# Patient Record
Sex: FEMALE | Race: WHITE | NOT HISPANIC OR LATINO
[De-identification: names, ages, dates, MRNs, and addresses within clinical notes are randomized per-mention and may not be internally consistent; named-entity substitution may affect disease eponyms.]

---

## 2017-03-16 ENCOUNTER — NON-APPOINTMENT (OUTPATIENT)
Age: 82
End: 2017-03-16

## 2017-03-16 ENCOUNTER — APPOINTMENT (OUTPATIENT)
Dept: CARDIOLOGY | Facility: CLINIC | Age: 82
End: 2017-03-16

## 2017-03-16 VITALS
OXYGEN SATURATION: 97 % | BODY MASS INDEX: 24.75 KG/M2 | DIASTOLIC BLOOD PRESSURE: 71 MMHG | HEART RATE: 82 BPM | SYSTOLIC BLOOD PRESSURE: 155 MMHG | WEIGHT: 154 LBS | HEIGHT: 66 IN

## 2017-03-16 DIAGNOSIS — R42 DIZZINESS AND GIDDINESS: ICD-10-CM

## 2017-03-23 ENCOUNTER — APPOINTMENT (OUTPATIENT)
Dept: CARDIOLOGY | Facility: CLINIC | Age: 82
End: 2017-03-23

## 2018-09-14 ENCOUNTER — TRANSCRIPTION ENCOUNTER (OUTPATIENT)
Age: 83
End: 2018-09-14

## 2018-09-14 ENCOUNTER — EMERGENCY (EMERGENCY)
Facility: HOSPITAL | Age: 83
LOS: 1 days | Discharge: HOME | End: 2018-09-14
Attending: EMERGENCY MEDICINE
Payer: MEDICARE

## 2018-09-14 VITALS
TEMPERATURE: 98 F | SYSTOLIC BLOOD PRESSURE: 159 MMHG | HEART RATE: 67 BPM | RESPIRATION RATE: 16 BRPM | OXYGEN SATURATION: 96 % | DIASTOLIC BLOOD PRESSURE: 65 MMHG

## 2018-09-14 VITALS
HEART RATE: 74 BPM | RESPIRATION RATE: 16 BRPM | WEIGHT: 145.06 LBS | HEIGHT: 65 IN | DIASTOLIC BLOOD PRESSURE: 67 MMHG | SYSTOLIC BLOOD PRESSURE: 190 MMHG | OXYGEN SATURATION: 97 % | TEMPERATURE: 98 F

## 2018-09-14 DIAGNOSIS — Z98.49 CATARACT EXTRACTION STATUS, UNSPECIFIED EYE: Chronic | ICD-10-CM

## 2018-09-14 DIAGNOSIS — Z96.7 PRESENCE OF OTHER BONE AND TENDON IMPLANTS: Chronic | ICD-10-CM

## 2018-09-14 LAB
ALBUMIN SERPL ELPH-MCNC: 4 G/DL — SIGNIFICANT CHANGE UP (ref 3.3–5)
ALP SERPL-CCNC: 64 U/L — SIGNIFICANT CHANGE UP (ref 40–120)
ALT FLD-CCNC: 20 U/L — SIGNIFICANT CHANGE UP (ref 12–78)
ANION GAP SERPL CALC-SCNC: 7 MMOL/L — SIGNIFICANT CHANGE UP (ref 5–17)
APTT BLD: 35.8 SEC — SIGNIFICANT CHANGE UP (ref 27.5–37.4)
AST SERPL-CCNC: 17 U/L — SIGNIFICANT CHANGE UP (ref 15–37)
BASOPHILS # BLD AUTO: 0.03 K/UL — SIGNIFICANT CHANGE UP (ref 0–0.2)
BASOPHILS NFR BLD AUTO: 0.3 % — SIGNIFICANT CHANGE UP (ref 0–2)
BILIRUB SERPL-MCNC: 0.6 MG/DL — SIGNIFICANT CHANGE UP (ref 0.2–1.2)
BUN SERPL-MCNC: 10 MG/DL — SIGNIFICANT CHANGE UP (ref 7–23)
CALCIUM SERPL-MCNC: 9.1 MG/DL — SIGNIFICANT CHANGE UP (ref 8.5–10.1)
CHLORIDE SERPL-SCNC: 95 MMOL/L — LOW (ref 96–108)
CK MB BLD-MCNC: <2.2 % — SIGNIFICANT CHANGE UP (ref 0–3.5)
CK MB CFR SERPL CALC: <1 NG/ML — SIGNIFICANT CHANGE UP (ref 0–3.6)
CK SERPL-CCNC: 46 U/L — SIGNIFICANT CHANGE UP (ref 26–192)
CO2 SERPL-SCNC: 30 MMOL/L — SIGNIFICANT CHANGE UP (ref 22–31)
CREAT SERPL-MCNC: 0.61 MG/DL — SIGNIFICANT CHANGE UP (ref 0.5–1.3)
EOSINOPHIL # BLD AUTO: 0.06 K/UL — SIGNIFICANT CHANGE UP (ref 0–0.5)
EOSINOPHIL NFR BLD AUTO: 0.6 % — SIGNIFICANT CHANGE UP (ref 0–6)
GLUCOSE SERPL-MCNC: 115 MG/DL — HIGH (ref 70–99)
HCT VFR BLD CALC: 39.5 % — SIGNIFICANT CHANGE UP (ref 34.5–45)
HGB BLD-MCNC: 13.6 G/DL — SIGNIFICANT CHANGE UP (ref 11.5–15.5)
IMM GRANULOCYTES NFR BLD AUTO: 0.3 % — SIGNIFICANT CHANGE UP (ref 0–1.5)
INR BLD: 1.11 RATIO — SIGNIFICANT CHANGE UP (ref 0.88–1.16)
LYMPHOCYTES # BLD AUTO: 1.89 K/UL — SIGNIFICANT CHANGE UP (ref 1–3.3)
LYMPHOCYTES # BLD AUTO: 19.6 % — SIGNIFICANT CHANGE UP (ref 13–44)
MCHC RBC-ENTMCNC: 30.9 PG — SIGNIFICANT CHANGE UP (ref 27–34)
MCHC RBC-ENTMCNC: 34.4 GM/DL — SIGNIFICANT CHANGE UP (ref 32–36)
MCV RBC AUTO: 89.8 FL — SIGNIFICANT CHANGE UP (ref 80–100)
MONOCYTES # BLD AUTO: 0.69 K/UL — SIGNIFICANT CHANGE UP (ref 0–0.9)
MONOCYTES NFR BLD AUTO: 7.2 % — SIGNIFICANT CHANGE UP (ref 2–14)
NEUTROPHILS # BLD AUTO: 6.94 K/UL — SIGNIFICANT CHANGE UP (ref 1.8–7.4)
NEUTROPHILS NFR BLD AUTO: 72 % — SIGNIFICANT CHANGE UP (ref 43–77)
NRBC # BLD: 0 /100 WBCS — SIGNIFICANT CHANGE UP (ref 0–0)
PLATELET # BLD AUTO: 353 K/UL — SIGNIFICANT CHANGE UP (ref 150–400)
POTASSIUM SERPL-MCNC: 4.3 MMOL/L — SIGNIFICANT CHANGE UP (ref 3.5–5.3)
POTASSIUM SERPL-SCNC: 4.3 MMOL/L — SIGNIFICANT CHANGE UP (ref 3.5–5.3)
PROT SERPL-MCNC: 8.1 G/DL — SIGNIFICANT CHANGE UP (ref 6–8.3)
PROTHROM AB SERPL-ACNC: 12.1 SEC — SIGNIFICANT CHANGE UP (ref 9.8–12.7)
RBC # BLD: 4.4 M/UL — SIGNIFICANT CHANGE UP (ref 3.8–5.2)
RBC # FLD: 12.9 % — SIGNIFICANT CHANGE UP (ref 10.3–14.5)
SODIUM SERPL-SCNC: 132 MMOL/L — LOW (ref 135–145)
TROPONIN I SERPL-MCNC: <.015 NG/ML — SIGNIFICANT CHANGE UP (ref 0.01–0.04)
WBC # BLD: 9.64 K/UL — SIGNIFICANT CHANGE UP (ref 3.8–10.5)
WBC # FLD AUTO: 9.64 K/UL — SIGNIFICANT CHANGE UP (ref 3.8–10.5)

## 2018-09-14 PROCEDURE — 99284 EMERGENCY DEPT VISIT MOD MDM: CPT | Mod: 25

## 2018-09-14 PROCEDURE — 93005 ELECTROCARDIOGRAM TRACING: CPT

## 2018-09-14 PROCEDURE — 96360 HYDRATION IV INFUSION INIT: CPT

## 2018-09-14 PROCEDURE — 71045 X-RAY EXAM CHEST 1 VIEW: CPT | Mod: 26

## 2018-09-14 PROCEDURE — 84484 ASSAY OF TROPONIN QUANT: CPT

## 2018-09-14 PROCEDURE — 80053 COMPREHEN METABOLIC PANEL: CPT

## 2018-09-14 PROCEDURE — 85610 PROTHROMBIN TIME: CPT

## 2018-09-14 PROCEDURE — 82553 CREATINE MB FRACTION: CPT

## 2018-09-14 PROCEDURE — 85027 COMPLETE CBC AUTOMATED: CPT

## 2018-09-14 PROCEDURE — 71045 X-RAY EXAM CHEST 1 VIEW: CPT

## 2018-09-14 PROCEDURE — 82550 ASSAY OF CK (CPK): CPT

## 2018-09-14 PROCEDURE — 85730 THROMBOPLASTIN TIME PARTIAL: CPT

## 2018-09-14 PROCEDURE — 36415 COLL VENOUS BLD VENIPUNCTURE: CPT

## 2018-09-14 PROCEDURE — 99285 EMERGENCY DEPT VISIT HI MDM: CPT

## 2018-09-14 PROCEDURE — 70551 MRI BRAIN STEM W/O DYE: CPT

## 2018-09-14 PROCEDURE — 70551 MRI BRAIN STEM W/O DYE: CPT | Mod: 26

## 2018-09-14 RX ORDER — ASPIRIN/CALCIUM CARB/MAGNESIUM 324 MG
325 TABLET ORAL ONCE
Qty: 0 | Refills: 0 | Status: COMPLETED | OUTPATIENT
Start: 2018-09-14 | End: 2018-09-14

## 2018-09-14 RX ORDER — SODIUM CHLORIDE 9 MG/ML
1000 INJECTION INTRAMUSCULAR; INTRAVENOUS; SUBCUTANEOUS ONCE
Qty: 0 | Refills: 0 | Status: COMPLETED | OUTPATIENT
Start: 2018-09-14 | End: 2018-09-14

## 2018-09-14 RX ORDER — SODIUM CHLORIDE 9 MG/ML
3 INJECTION INTRAMUSCULAR; INTRAVENOUS; SUBCUTANEOUS ONCE
Qty: 0 | Refills: 0 | Status: COMPLETED | OUTPATIENT
Start: 2018-09-14 | End: 2018-09-14

## 2018-09-14 RX ORDER — MECLIZINE HCL 12.5 MG
12.5 TABLET ORAL ONCE
Qty: 0 | Refills: 0 | Status: COMPLETED | OUTPATIENT
Start: 2018-09-14 | End: 2018-09-14

## 2018-09-14 RX ORDER — MECLIZINE HCL 12.5 MG
1 TABLET ORAL
Qty: 10 | Refills: 0
Start: 2018-09-14

## 2018-09-14 RX ADMIN — SODIUM CHLORIDE 1000 MILLILITER(S): 9 INJECTION INTRAMUSCULAR; INTRAVENOUS; SUBCUTANEOUS at 18:40

## 2018-09-14 RX ADMIN — Medication 12.5 MILLIGRAM(S): at 18:40

## 2018-09-14 RX ADMIN — SODIUM CHLORIDE 3 MILLILITER(S): 9 INJECTION INTRAMUSCULAR; INTRAVENOUS; SUBCUTANEOUS at 18:39

## 2018-09-14 RX ADMIN — Medication 325 MILLIGRAM(S): at 18:40

## 2018-09-14 RX ADMIN — SODIUM CHLORIDE 1000 MILLILITER(S): 9 INJECTION INTRAMUSCULAR; INTRAVENOUS; SUBCUTANEOUS at 19:40

## 2018-09-14 NOTE — ED PROVIDER NOTE - ENMT, MLM
Airway patent, Nasal mucosa clear. Mouth with normal mucosa. Throat has no vesicles, no oropharyngeal exudates and uvula is midline. Neck supple. no meningeals signs.

## 2018-09-14 NOTE — ED PROVIDER NOTE - PSH
S/P cataract extraction  left 2014  S/P ORIF (open reduction internal fixation) fracture  right ankle

## 2018-09-14 NOTE — ED PROVIDER NOTE - OBJECTIVE STATEMENT
85 yo F p/w dizziness x past ~ 1 week. Pty states feels off balance when walking.  Min spinning sensation. No numb/ting/focal weak. No syncope / near syncope. No ha/visual changes. no n/v/d. No recent CRUZ / easy fatigue. No chest pains. no abd pain. No fever/chills/recent illness. no agg/allev factors. No other inj or co.

## 2018-09-14 NOTE — ED PROVIDER NOTE - PROGRESS NOTE DETAILS
Dw Dr Dimas, check MRI, will see pt Pt seen by Dr Dimas, will await MRI res. Pt doing well, feeling much improved. No acute co or change.s Dw Dr Dimas re labs / MRI res. Oko to dc home , for outpt fu.  Pts BP also improved as well.  Reevaluated patient at bedside.  Patient feeling much improved.  Discussed the results of all diagnostic testing in ED and copies of all reports given.   An opportunity to ask questions was given.  Discussed the importance of prompt, close medical follow-up.  Patient will return with any changes, concerns or persistent / worsening symptoms.  Understanding of all instructions verbalized.   Dr Alejandro evangelista Antonio Allen, agree with dc, plan, will fu with pt in office. Dw family at length re all findings.

## 2018-09-14 NOTE — ED ADULT NURSE NOTE - OBJECTIVE STATEMENT
pt with complaints of dizziness for the past week. saw PMD today and was told to come here for evaluation. pt states she is under a lot of stress recently. pt denies nausea or vomiting.

## 2018-09-14 NOTE — ED PROVIDER NOTE - GASTROINTESTINAL, MLM
1. Have you been to the ER, urgent care clinic since your last visit? Hospitalized since your last visit? No    2. Have you seen or consulted any other health care providers outside of the [address] [place] [address] since your last visit? Include any pap smears or colon screening.  No Abdomen soft, non-tender, no guarding.

## 2018-09-14 NOTE — ED PROVIDER NOTE - CHPI ED SYMPTOMS NEG
no change in level of consciousness/no confusion/no blurred vision/no weakness/no fever/no loss of consciousness/no numbness/no vomiting

## 2018-09-14 NOTE — ED PROVIDER NOTE - PMH
Chronic Glaucoma    Dyslipidemia    HTN (Hypertension)    Hypothyroidism    Inflammation of ankle joint, right

## 2018-09-17 ENCOUNTER — NON-APPOINTMENT (OUTPATIENT)
Age: 83
End: 2018-09-17

## 2018-09-17 ENCOUNTER — APPOINTMENT (OUTPATIENT)
Dept: CARDIOLOGY | Facility: CLINIC | Age: 83
End: 2018-09-17

## 2018-09-17 ENCOUNTER — APPOINTMENT (OUTPATIENT)
Dept: CARDIOLOGY | Facility: CLINIC | Age: 83
End: 2018-09-17
Payer: MEDICARE

## 2018-09-17 VITALS
DIASTOLIC BLOOD PRESSURE: 72 MMHG | BODY MASS INDEX: 22.5 KG/M2 | WEIGHT: 140 LBS | SYSTOLIC BLOOD PRESSURE: 146 MMHG | HEART RATE: 72 BPM | HEIGHT: 66 IN | OXYGEN SATURATION: 97 %

## 2018-09-17 DIAGNOSIS — I10 ESSENTIAL (PRIMARY) HYPERTENSION: ICD-10-CM

## 2018-09-17 PROCEDURE — 93000 ELECTROCARDIOGRAM COMPLETE: CPT

## 2018-09-17 PROCEDURE — 99215 OFFICE O/P EST HI 40 MIN: CPT

## 2018-09-20 ENCOUNTER — APPOINTMENT (OUTPATIENT)
Dept: CARDIOLOGY | Facility: CLINIC | Age: 83
End: 2018-09-20
Payer: MEDICARE

## 2018-09-20 PROCEDURE — 93790 AMBL BP MNTR W/SW I&R: CPT

## 2018-10-04 ENCOUNTER — APPOINTMENT (OUTPATIENT)
Dept: CARDIOLOGY | Facility: CLINIC | Age: 83
End: 2018-10-04
Payer: MEDICARE

## 2018-10-04 PROCEDURE — 93306 TTE W/DOPPLER COMPLETE: CPT

## 2019-05-15 ENCOUNTER — APPOINTMENT (OUTPATIENT)
Dept: CARDIOLOGY | Facility: CLINIC | Age: 84
End: 2019-05-15
Payer: MEDICARE

## 2019-05-15 ENCOUNTER — NON-APPOINTMENT (OUTPATIENT)
Age: 84
End: 2019-05-15

## 2019-05-15 VITALS
OXYGEN SATURATION: 97 % | HEART RATE: 81 BPM | WEIGHT: 145 LBS | BODY MASS INDEX: 23.3 KG/M2 | HEIGHT: 66 IN | SYSTOLIC BLOOD PRESSURE: 155 MMHG | DIASTOLIC BLOOD PRESSURE: 75 MMHG

## 2019-05-15 PROCEDURE — 99214 OFFICE O/P EST MOD 30 MIN: CPT

## 2019-05-15 PROCEDURE — 93000 ELECTROCARDIOGRAM COMPLETE: CPT

## 2019-05-15 NOTE — CARDIOLOGY SUMMARY
[No Ischemia] : no Ischemia [No Exercise Ind Arr] : no exercise induced arrhythmias [___] : [unfilled] [LVEF ___%] : LVEF [unfilled]% [No Symptoms] : no Symptoms [Normal] : normal LA size [None] : normal LV function [Mild] : mild mitral regurgitation

## 2019-05-15 NOTE — HISTORY OF PRESENT ILLNESS
[FreeTextEntry1] : Elinor Kern presented to the office today for a followup cardiovascular evaluation.  She was last seen in the office in September.\par \par She is now 85 years old, with a history of hypertension with a significant reactive component. She has a history of high cholesterol, for which she has been taking atorvastatin. She has a history of temporal headaches, which she has previously related to emotional stress. This has been evaluated comprehensively in the past.\par \par When she was last in the office, she was under significant emotional stress. Her blood pressure had been quite high and she had been seen in the ER where her medication was adjusted.  I felt that her blood pressure might be elevated based on reactive hypertension, and that her actual blood pressure might be well-controlled. I recommended ambulatory 24-hour blood pressure monitoring, which suggested that her blood pressure was exceptionally well controlled, and potentially even a little bit low.\par \par She presents to the office today reporting some lightheadedness. She does not describe as well, but believes that she experiences lightheadedness when she stands on an intermittent basis. Her blood pressure has been controlled, and even on the high side at times. She does not report any observations of the she denies any anginal type chest discomfort. She remains under significant emotional stress as her  has had progressive dementia, and she is his primary caregiver and

## 2019-05-15 NOTE — DISCUSSION/SUMMARY
[FreeTextEntry1] : It remains unclear to what degree she has essential hypertension and to what degree she has reactive hypertension. I suspect that overall, she has well controlled essential hypertension, and more obvious reactive hypertension. Her intermittent lightheadedness may in fact be some relative hypotension. Her blood pressure is elevated today, and I will not reduce her medicine, but I have suggested that she must keep a close eye on his symptoms. She will contact the office in 6 weeks, and we will reevaluate to what degree she has this lightheadedness. He might consider reducing some of her medication, to see how she does.

## 2019-05-15 NOTE — PHYSICAL EXAM
[Well Groomed] : well groomed [Normal Appearance] : normal appearance [General Appearance - Well Developed] : well developed [General Appearance - In No Acute Distress] : no acute distress [General Appearance - Well Nourished] : well nourished [No Deformities] : no deformities [Eyelids - No Xanthelasma] : the eyelids demonstrated no xanthelasmas [Normal Conjunctiva] : the conjunctiva exhibited no abnormalities [No Oral Cyanosis] : no oral cyanosis [No Oral Pallor] : no oral pallor [Normal Oral Mucosa] : normal oral mucosa [Normal Jugular Venous V Waves Present] : normal jugular venous V waves present [Normal Jugular Venous A Waves Present] : normal jugular venous A waves present [No Jugular Venous Mccrary A Waves] : no jugular venous mccrary A waves [Exaggerated Use Of Accessory Muscles For Inspiration] : no accessory muscle use [Auscultation Breath Sounds / Voice Sounds] : lungs were clear to auscultation bilaterally [Respiration, Rhythm And Depth] : normal respiratory rhythm and effort [Abdomen Tenderness] : non-tender [Abdomen Soft] : soft [Abdomen Mass (___ Cm)] : no abdominal mass palpated [Gait - Sufficient For Exercise Testing] : the gait was sufficient for exercise testing [Abnormal Walk] : normal gait [Nail Clubbing] : no clubbing of the fingernails [Cyanosis, Localized] : no localized cyanosis [Petechial Hemorrhages (___cm)] : no petechial hemorrhages [] : no ischemic changes [Skin Color & Pigmentation] : normal skin color and pigmentation [Affect] : the affect was normal [Mood] : the mood was normal [Oriented To Time, Place, And Person] : oriented to person, place, and time [No Anxiety] : not feeling anxious [5th Left ICS - MCL] : palpated at the 5th LICS in the midclavicular line [Apical Thrill] : no thrill palpable at the apex [Normal] : normal [No Precordial Heave] : no precordial heave was noted [Rhythm Regular] : regular [Normal Rate] : normal [Normal S1] : normal S1 [S3] : no S3 [Normal S2] : normal S2 [S4] : no S4 [Pericardial Rub] : no pericardial rub [Click] : no click [No Murmur] : no murmurs heard [Right Carotid Bruit] : no bruit heard over the right carotid [2+] : right 2+ [Left Carotid Bruit] : no bruit heard over the left carotid [Left Femoral Bruit] : no bruit heard over the left femoral artery [Right Femoral Bruit] : no bruit heard over the right femoral artery [1+] : left 1+ [Bruit] : no bruit heard [No Pitting Edema] : no pitting edema present

## 2019-07-24 ENCOUNTER — NON-APPOINTMENT (OUTPATIENT)
Age: 84
End: 2019-07-24

## 2019-07-24 ENCOUNTER — APPOINTMENT (OUTPATIENT)
Dept: CARDIOLOGY | Facility: CLINIC | Age: 84
End: 2019-07-24
Payer: MEDICARE

## 2019-07-24 VITALS
BODY MASS INDEX: 23.14 KG/M2 | HEART RATE: 79 BPM | SYSTOLIC BLOOD PRESSURE: 124 MMHG | WEIGHT: 144 LBS | HEIGHT: 66 IN | OXYGEN SATURATION: 97 % | DIASTOLIC BLOOD PRESSURE: 68 MMHG

## 2019-07-24 PROCEDURE — 93000 ELECTROCARDIOGRAM COMPLETE: CPT

## 2019-07-24 PROCEDURE — 99214 OFFICE O/P EST MOD 30 MIN: CPT

## 2019-07-24 NOTE — HISTORY OF PRESENT ILLNESS
[FreeTextEntry1] : Elinor Kern presented to the office today for a followup cardiovascular evaluation.  She was last seen in the office in May.\par \par She is now 85 years old, with a history of hypertension with a significant reactive component. She has a history of high cholesterol, for which she has been taking atorvastatin. She has a history of temporal headaches, which she has previously related to emotional stress. This has been evaluated comprehensively in the past.\par \par When she was last in the office, she reported lightheadedness. She did not describe as well, but believed that she experiences lightheadedness when she stood on an intermittent basis. I felt that her blood pressure might be a bit low at times, but noting that her blood pressure in the office was high, I did not make any changes at that time. She had a comprehensive ENT evaluation because of the lightheadedness, which was reportedly unremarkable.\par \par Since the last visit, she has been staggering her blood pressure medication, and has felt better, though her symptoms do persist at a more mild level. She does not report symptoms suggestive of angina, heart failure or arrhythmia.

## 2019-07-24 NOTE — CARDIOLOGY SUMMARY
[No Ischemia] : no Ischemia [No Exercise Ind Arr] : no exercise induced arrhythmias [No Symptoms] : no Symptoms [___] : [unfilled] [LVEF ___%] : LVEF [unfilled]% [None] : normal LV function [Normal] : normal LA size [Mild] : mild mitral regurgitation

## 2019-07-24 NOTE — DISCUSSION/SUMMARY
[FreeTextEntry1] : It remains unclear to what degree she has essential hypertension and to what degree she has reactive hypertension. I suspect that overall, she has well controlled essential hypertension, and more obvious reactive hypertension. Her intermittent lightheadedness may in fact be some relative hypotension. \par \par Given her course, with improvement with staggering of her blood pressure medicine, I think her hypotension is relatively more likely. She will reduce amlodipine down to 2.5 mg daily. She will call me in a couple weeks, and let me know if things are improved. As a precaution, she will see me again in October, and hopefully we can consider this matter closed.

## 2019-07-24 NOTE — PHYSICAL EXAM
[General Appearance - Well Developed] : well developed [Well Groomed] : well groomed [Normal Appearance] : normal appearance [General Appearance - Well Nourished] : well nourished [No Deformities] : no deformities [General Appearance - In No Acute Distress] : no acute distress [Eyelids - No Xanthelasma] : the eyelids demonstrated no xanthelasmas [Normal Conjunctiva] : the conjunctiva exhibited no abnormalities [Normal Oral Mucosa] : normal oral mucosa [No Oral Pallor] : no oral pallor [No Oral Cyanosis] : no oral cyanosis [Normal Jugular Venous V Waves Present] : normal jugular venous V waves present [Normal Jugular Venous A Waves Present] : normal jugular venous A waves present [No Jugular Venous Mccrary A Waves] : no jugular venous mccrary A waves [Respiration, Rhythm And Depth] : normal respiratory rhythm and effort [Auscultation Breath Sounds / Voice Sounds] : lungs were clear to auscultation bilaterally [Exaggerated Use Of Accessory Muscles For Inspiration] : no accessory muscle use [Abdomen Soft] : soft [Abdomen Tenderness] : non-tender [Abdomen Mass (___ Cm)] : no abdominal mass palpated [Abnormal Walk] : normal gait [Nail Clubbing] : no clubbing of the fingernails [Gait - Sufficient For Exercise Testing] : the gait was sufficient for exercise testing [Cyanosis, Localized] : no localized cyanosis [Petechial Hemorrhages (___cm)] : no petechial hemorrhages [] : no rash [Skin Color & Pigmentation] : normal skin color and pigmentation [Oriented To Time, Place, And Person] : oriented to person, place, and time [Affect] : the affect was normal [No Anxiety] : not feeling anxious [Mood] : the mood was normal [Normal] : normal [5th Left ICS - MCL] : palpated at the 5th LICS in the midclavicular line [No Precordial Heave] : no precordial heave was noted [Normal Rate] : normal [Rhythm Regular] : regular [Normal S1] : normal S1 [Normal S2] : normal S2 [No Murmur] : no murmurs heard [2+] : left 2+ [No Pitting Edema] : no pitting edema present [1+] : left 1+ [Apical Thrill] : no thrill palpable at the apex [S3] : no S3 [S4] : no S4 [Click] : no click [Pericardial Rub] : no pericardial rub [Right Carotid Bruit] : no bruit heard over the right carotid [Left Carotid Bruit] : no bruit heard over the left carotid [Right Femoral Bruit] : no bruit heard over the right femoral artery [Left Femoral Bruit] : no bruit heard over the left femoral artery [Bruit] : no bruit heard

## 2019-10-08 ENCOUNTER — APPOINTMENT (OUTPATIENT)
Dept: CARDIOLOGY | Facility: CLINIC | Age: 84
End: 2019-10-08
Payer: MEDICARE

## 2019-10-08 VITALS
WEIGHT: 147 LBS | HEIGHT: 66 IN | HEART RATE: 72 BPM | OXYGEN SATURATION: 96 % | DIASTOLIC BLOOD PRESSURE: 75 MMHG | BODY MASS INDEX: 23.63 KG/M2 | SYSTOLIC BLOOD PRESSURE: 157 MMHG

## 2019-10-08 PROCEDURE — 99213 OFFICE O/P EST LOW 20 MIN: CPT

## 2019-10-08 NOTE — PHYSICAL EXAM
[General Appearance - Well Developed] : well developed [Normal Appearance] : normal appearance [Well Groomed] : well groomed [General Appearance - Well Nourished] : well nourished [No Deformities] : no deformities [General Appearance - In No Acute Distress] : no acute distress [Normal Conjunctiva] : the conjunctiva exhibited no abnormalities [Normal Oral Mucosa] : normal oral mucosa [Eyelids - No Xanthelasma] : the eyelids demonstrated no xanthelasmas [No Oral Pallor] : no oral pallor [No Oral Cyanosis] : no oral cyanosis [Normal Jugular Venous A Waves Present] : normal jugular venous A waves present [No Jugular Venous Mccrary A Waves] : no jugular venous mccrary A waves [Normal Jugular Venous V Waves Present] : normal jugular venous V waves present [Gait - Sufficient For Exercise Testing] : the gait was sufficient for exercise testing [Abnormal Walk] : normal gait [Nail Clubbing] : no clubbing of the fingernails [Cyanosis, Localized] : no localized cyanosis [Petechial Hemorrhages (___cm)] : no petechial hemorrhages [] : no rash [Skin Color & Pigmentation] : normal skin color and pigmentation [Affect] : the affect was normal [Oriented To Time, Place, And Person] : oriented to person, place, and time [Mood] : the mood was normal [No Anxiety] : not feeling anxious

## 2019-10-08 NOTE — DISCUSSION/SUMMARY
[FreeTextEntry1] : It remains unclear to what degree she has essential hypertension and to what degree she has reactive hypertension. I suspect that overall, she has well controlled essential hypertension, and more obvious reactive hypertension. Her intermittent lightheadedness may in fact be some relative hypotension. \par \par She will cut candesartan in half. She will call me next week, and let me know how she feels. I would consider repeating ambulatory 24-hour blood pressure monitoring to see where she is.

## 2019-10-08 NOTE — HISTORY OF PRESENT ILLNESS
[FreeTextEntry1] : Elinor Kern presented to the office today for a followup cardiovascular evaluation.  She was last seen in the office in July.\par \par She is now 85 years old, with a history of hypertension with a significant reactive component. She has a history of high cholesterol, for which she has been taking atorvastatin. She has a history of temporal headaches, which she has previously related to emotional stress. This has been evaluated comprehensively in the past.\par \par When she was last in the office, she reported lightheadedness. She did not describe as well, but believed that she experiences lightheadedness when she stood on an intermittent basis. I felt that her blood pressure might be a bit low at times.  I reduced her amlodipine.\par \par Since reducing her dose of amlodipine, she has felt okay, though she continues to have lightheadedness. This seems to occur in the mornings after she takes candesartan. She has not had syncope.

## 2019-10-15 ENCOUNTER — INPATIENT (INPATIENT)
Facility: HOSPITAL | Age: 84
LOS: 1 days | Discharge: HOME | DRG: 310 | End: 2019-10-17
Attending: STUDENT IN AN ORGANIZED HEALTH CARE EDUCATION/TRAINING PROGRAM | Admitting: STUDENT IN AN ORGANIZED HEALTH CARE EDUCATION/TRAINING PROGRAM
Payer: MEDICARE

## 2019-10-15 VITALS
DIASTOLIC BLOOD PRESSURE: 78 MMHG | SYSTOLIC BLOOD PRESSURE: 172 MMHG | HEIGHT: 65 IN | RESPIRATION RATE: 18 BRPM | WEIGHT: 147.93 LBS | OXYGEN SATURATION: 96 % | TEMPERATURE: 98 F | HEART RATE: 100 BPM

## 2019-10-15 DIAGNOSIS — R42 DIZZINESS AND GIDDINESS: ICD-10-CM

## 2019-10-15 DIAGNOSIS — Z96.7 PRESENCE OF OTHER BONE AND TENDON IMPLANTS: Chronic | ICD-10-CM

## 2019-10-15 DIAGNOSIS — Z98.49 CATARACT EXTRACTION STATUS, UNSPECIFIED EYE: Chronic | ICD-10-CM

## 2019-10-15 DIAGNOSIS — I48.91 UNSPECIFIED ATRIAL FIBRILLATION: ICD-10-CM

## 2019-10-15 DIAGNOSIS — E03.9 HYPOTHYROIDISM, UNSPECIFIED: ICD-10-CM

## 2019-10-15 DIAGNOSIS — E78.5 HYPERLIPIDEMIA, UNSPECIFIED: ICD-10-CM

## 2019-10-15 DIAGNOSIS — H40.1190 PRIMARY OPEN-ANGLE GLAUCOMA, UNSPECIFIED EYE, STAGE UNSPECIFIED: ICD-10-CM

## 2019-10-15 DIAGNOSIS — I10 ESSENTIAL (PRIMARY) HYPERTENSION: ICD-10-CM

## 2019-10-15 LAB
ALBUMIN SERPL ELPH-MCNC: 3.9 G/DL — SIGNIFICANT CHANGE UP (ref 3.3–5)
ALP SERPL-CCNC: 50 U/L — SIGNIFICANT CHANGE UP (ref 30–120)
ALT FLD-CCNC: 23 U/L DA — SIGNIFICANT CHANGE UP (ref 10–60)
ANION GAP SERPL CALC-SCNC: 5 MMOL/L — SIGNIFICANT CHANGE UP (ref 5–17)
APTT BLD: 31.9 SEC — SIGNIFICANT CHANGE UP (ref 28.5–37)
AST SERPL-CCNC: 18 U/L — SIGNIFICANT CHANGE UP (ref 10–40)
BASOPHILS # BLD AUTO: 0.03 K/UL — SIGNIFICANT CHANGE UP (ref 0–0.2)
BASOPHILS NFR BLD AUTO: 0.5 % — SIGNIFICANT CHANGE UP (ref 0–2)
BILIRUB SERPL-MCNC: 0.5 MG/DL — SIGNIFICANT CHANGE UP (ref 0.2–1.2)
BUN SERPL-MCNC: 8 MG/DL — SIGNIFICANT CHANGE UP (ref 7–23)
CALCIUM SERPL-MCNC: 9.7 MG/DL — SIGNIFICANT CHANGE UP (ref 8.4–10.5)
CHLORIDE SERPL-SCNC: 100 MMOL/L — SIGNIFICANT CHANGE UP (ref 96–108)
CO2 SERPL-SCNC: 33 MMOL/L — HIGH (ref 22–31)
CREAT SERPL-MCNC: 0.57 MG/DL — SIGNIFICANT CHANGE UP (ref 0.5–1.3)
EOSINOPHIL # BLD AUTO: 0.08 K/UL — SIGNIFICANT CHANGE UP (ref 0–0.5)
EOSINOPHIL NFR BLD AUTO: 1.3 % — SIGNIFICANT CHANGE UP (ref 0–6)
GLUCOSE SERPL-MCNC: 116 MG/DL — HIGH (ref 70–99)
HCT VFR BLD CALC: 43.1 % — SIGNIFICANT CHANGE UP (ref 34.5–45)
HGB BLD-MCNC: 14.7 G/DL — SIGNIFICANT CHANGE UP (ref 11.5–15.5)
IMM GRANULOCYTES NFR BLD AUTO: 0.3 % — SIGNIFICANT CHANGE UP (ref 0–1.5)
INR BLD: 1.04 RATIO — SIGNIFICANT CHANGE UP (ref 0.88–1.16)
LYMPHOCYTES # BLD AUTO: 1.53 K/UL — SIGNIFICANT CHANGE UP (ref 1–3.3)
LYMPHOCYTES # BLD AUTO: 25.1 % — SIGNIFICANT CHANGE UP (ref 13–44)
MAGNESIUM SERPL-MCNC: 2.1 MG/DL — SIGNIFICANT CHANGE UP (ref 1.6–2.6)
MCHC RBC-ENTMCNC: 31.5 PG — SIGNIFICANT CHANGE UP (ref 27–34)
MCHC RBC-ENTMCNC: 34.1 GM/DL — SIGNIFICANT CHANGE UP (ref 32–36)
MCV RBC AUTO: 92.5 FL — SIGNIFICANT CHANGE UP (ref 80–100)
MONOCYTES # BLD AUTO: 0.51 K/UL — SIGNIFICANT CHANGE UP (ref 0–0.9)
MONOCYTES NFR BLD AUTO: 8.4 % — SIGNIFICANT CHANGE UP (ref 2–14)
NEUTROPHILS # BLD AUTO: 3.92 K/UL — SIGNIFICANT CHANGE UP (ref 1.8–7.4)
NEUTROPHILS NFR BLD AUTO: 64.4 % — SIGNIFICANT CHANGE UP (ref 43–77)
NRBC # BLD: 0 /100 WBCS — SIGNIFICANT CHANGE UP (ref 0–0)
PLATELET # BLD AUTO: 313 K/UL — SIGNIFICANT CHANGE UP (ref 150–400)
POTASSIUM SERPL-MCNC: 4.7 MMOL/L — SIGNIFICANT CHANGE UP (ref 3.5–5.3)
POTASSIUM SERPL-SCNC: 4.7 MMOL/L — SIGNIFICANT CHANGE UP (ref 3.5–5.3)
PROT SERPL-MCNC: 7.6 G/DL — SIGNIFICANT CHANGE UP (ref 6–8.3)
PROTHROM AB SERPL-ACNC: 11.4 SEC — SIGNIFICANT CHANGE UP (ref 10–12.9)
RBC # BLD: 4.66 M/UL — SIGNIFICANT CHANGE UP (ref 3.8–5.2)
RBC # FLD: 12.8 % — SIGNIFICANT CHANGE UP (ref 10.3–14.5)
SODIUM SERPL-SCNC: 138 MMOL/L — SIGNIFICANT CHANGE UP (ref 135–145)
TROPONIN I SERPL-MCNC: 0 NG/ML — LOW (ref 0.02–0.06)
TROPONIN I SERPL-MCNC: 0 NG/ML — LOW (ref 0.02–0.06)
TSH SERPL-MCNC: 1.08 UIU/ML — SIGNIFICANT CHANGE UP (ref 0.27–4.2)
WBC # BLD: 6.09 K/UL — SIGNIFICANT CHANGE UP (ref 3.8–10.5)
WBC # FLD AUTO: 6.09 K/UL — SIGNIFICANT CHANGE UP (ref 3.8–10.5)

## 2019-10-15 PROCEDURE — 70450 CT HEAD/BRAIN W/O DYE: CPT | Mod: 26

## 2019-10-15 PROCEDURE — 99223 1ST HOSP IP/OBS HIGH 75: CPT

## 2019-10-15 PROCEDURE — 99285 EMERGENCY DEPT VISIT HI MDM: CPT

## 2019-10-15 PROCEDURE — 93010 ELECTROCARDIOGRAM REPORT: CPT

## 2019-10-15 PROCEDURE — 99223 1ST HOSP IP/OBS HIGH 75: CPT | Mod: AI

## 2019-10-15 PROCEDURE — 93880 EXTRACRANIAL BILAT STUDY: CPT | Mod: 26

## 2019-10-15 PROCEDURE — 71046 X-RAY EXAM CHEST 2 VIEWS: CPT | Mod: 26

## 2019-10-15 RX ORDER — AMITRIPTYLINE HCL 25 MG
5 TABLET ORAL DAILY
Refills: 0 | Status: DISCONTINUED | OUTPATIENT
Start: 2019-10-15 | End: 2019-10-17

## 2019-10-15 RX ORDER — METOPROLOL TARTRATE 50 MG
25 TABLET ORAL EVERY 12 HOURS
Refills: 0 | Status: DISCONTINUED | OUTPATIENT
Start: 2019-10-15 | End: 2019-10-16

## 2019-10-15 RX ORDER — NETARSUDIL 0.2 MG/ML
1 SOLUTION/ DROPS OPHTHALMIC; TOPICAL
Qty: 0 | Refills: 0 | DISCHARGE

## 2019-10-15 RX ORDER — LEVOTHYROXINE SODIUM 125 MCG
75 TABLET ORAL DAILY
Refills: 0 | Status: DISCONTINUED | OUTPATIENT
Start: 2019-10-15 | End: 2019-10-17

## 2019-10-15 RX ORDER — AMITRIPTYLINE HCL 25 MG
0.5 TABLET ORAL
Qty: 0 | Refills: 0 | DISCHARGE

## 2019-10-15 RX ORDER — SODIUM CHLORIDE 5 %
1 DROPS OPHTHALMIC (EYE)
Qty: 0 | Refills: 0 | DISCHARGE

## 2019-10-15 RX ORDER — AMLODIPINE BESYLATE 2.5 MG/1
2.5 TABLET ORAL DAILY
Refills: 0 | Status: DISCONTINUED | OUTPATIENT
Start: 2019-10-15 | End: 2019-10-17

## 2019-10-15 RX ORDER — AMITRIPTYLINE HCL 25 MG
5 TABLET ORAL DAILY
Refills: 0 | Status: DISCONTINUED | OUTPATIENT
Start: 2019-10-15 | End: 2019-10-15

## 2019-10-15 RX ORDER — ATORVASTATIN CALCIUM 80 MG/1
10 TABLET, FILM COATED ORAL AT BEDTIME
Refills: 0 | Status: DISCONTINUED | OUTPATIENT
Start: 2019-10-15 | End: 2019-10-17

## 2019-10-15 RX ORDER — LATANOPROST 0.05 MG/ML
1 SOLUTION/ DROPS OPHTHALMIC; TOPICAL
Qty: 0 | Refills: 0 | DISCHARGE

## 2019-10-15 RX ORDER — LEVOTHYROXINE SODIUM 125 MCG
1 TABLET ORAL
Qty: 0 | Refills: 0 | DISCHARGE

## 2019-10-15 RX ORDER — DILTIAZEM HCL 120 MG
5 CAPSULE, EXT RELEASE 24 HR ORAL ONCE
Refills: 0 | Status: COMPLETED | OUTPATIENT
Start: 2019-10-15 | End: 2019-10-15

## 2019-10-15 RX ORDER — CYCLOSPORINE 0.5 MG/ML
1 EMULSION OPHTHALMIC
Qty: 0 | Refills: 0 | DISCHARGE

## 2019-10-15 RX ORDER — AMLODIPINE BESYLATE 2.5 MG/1
1 TABLET ORAL
Qty: 0 | Refills: 0 | DISCHARGE

## 2019-10-15 RX ORDER — APIXABAN 2.5 MG/1
5 TABLET, FILM COATED ORAL EVERY 12 HOURS
Refills: 0 | Status: DISCONTINUED | OUTPATIENT
Start: 2019-10-15 | End: 2019-10-17

## 2019-10-15 RX ORDER — FAMOTIDINE 10 MG/ML
20 INJECTION INTRAVENOUS
Refills: 0 | Status: DISCONTINUED | OUTPATIENT
Start: 2019-10-15 | End: 2019-10-17

## 2019-10-15 RX ADMIN — Medication 5 MILLIGRAM(S): at 12:00

## 2019-10-15 RX ADMIN — ATORVASTATIN CALCIUM 10 MILLIGRAM(S): 80 TABLET, FILM COATED ORAL at 22:37

## 2019-10-15 RX ADMIN — APIXABAN 5 MILLIGRAM(S): 2.5 TABLET, FILM COATED ORAL at 17:24

## 2019-10-15 RX ADMIN — Medication 5 MILLIGRAM(S): at 22:37

## 2019-10-15 RX ADMIN — FAMOTIDINE 20 MILLIGRAM(S): 10 INJECTION INTRAVENOUS at 17:10

## 2019-10-15 RX ADMIN — Medication 25 MILLIGRAM(S): at 17:10

## 2019-10-15 NOTE — H&P ADULT - NSICDXPASTMEDICALHX_GEN_ALL_CORE_FT
PAST MEDICAL HISTORY:  Chronic Glaucoma     Dyslipidemia     HTN (Hypertension)     Hypothyroidism     Inflammation of ankle joint, right

## 2019-10-15 NOTE — ED ADULT NURSE NOTE - NSIMPLEMENTINTERV_GEN_ALL_ED
Implemented All Universal Safety Interventions:  [place] to call system. Call bell, personal items and telephone within reach. Instruct patient to call for assistance. Room bathroom lighting operational. Non-slip footwear when patient is off stretcher. Physically safe environment: no spills, clutter or unnecessary equipment. Stretcher in lowest position, wheels locked, appropriate side rails in place.

## 2019-10-15 NOTE — H&P ADULT - PROBLEM SELECTOR PLAN 6
cont metoprolol and amlodipine  started pepcid and is on apixabana s per cardio    d/w daughter and husaband

## 2019-10-15 NOTE — H&P ADULT - HISTORY OF PRESENT ILLNESS
this is a 86 y/o female with past hx of progressively worsening dizziness and hld, htn, genralized weakness, hypothyroidism, gerd presents to ED after she was feeling very light headed and dizzy. the patient narrates that she has been experiencing this for the last many days but it got worse today. she has had neuro work up done and that has been negative. seen  y ent also. last echo was in  office in 2018 october.  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 she has been taking levothyroxine for years and has no recent t4 tested as per her.  she was found to be in afib w rvr in ed and was given cardizem. she was on holter for a year in 2017 also with no event as per her.

## 2019-10-15 NOTE — H&P ADULT - NSICDXPASTSURGICALHX_GEN_ALL_CORE_FT
PAST SURGICAL HISTORY:  S/P cataract extraction left 2014    S/P ORIF (open reduction internal fixation) fracture right ankle

## 2019-10-15 NOTE — H&P ADULT - PROBLEM SELECTOR PLAN 1
admitted to telemetry  metoprolol 25mg po bid started  s/p iv cardizem  dr palla consulted  will get CE x3, ECHO, Carotid doppler and brain CT (before starting AC)  will check t4 (may need readjustment as she is very anxious)

## 2019-10-15 NOTE — ED ADULT NURSE NOTE - OBJECTIVE STATEMENT
Brought in by D, complaining of high blood pressure, has been lightheaded for a while. Denies chest pain, denies palpitations, no sob noted.

## 2019-10-15 NOTE — CONSULT NOTE ADULT - PROBLEM SELECTOR RECOMMENDATION 9
New onset afib with episodes of rapid rate , probably contributing to her dizziness episodes , now converted to sinus ,  CHADS2 VASC2   4   will need chronic anticoagulation  will once her CT head is negative , will discontinue norvasc , lower candesartan dose to 16 mg po daily  add metoprolol 25 mg po BID ,  continue to monitor

## 2019-10-15 NOTE — H&P ADULT - ASSESSMENT
this is an 84 y/o female with past hx of progressively worsening dizziness and hld, htn, genralized weakness, hypothyroidism, gerd presented with dizziness, weakness, palpitations and was found to be in afib w rvr

## 2019-10-15 NOTE — CONSULT NOTE ADULT - SUBJECTIVE AND OBJECTIVE BOX
CHIEF COMPLAINT: worsened dizziness this morning     HPI: 85 year old female with hx of HTN , Hypothyroidism  hyperlipidemia , glaucoma who came to hospital with complaining of not feeling well , dizziness this morning when she woke up which was worse than she used to have for many years , patient was  hospitalized for that in the past without obvious etiology , patient says it occurs in the morning time ,not associated with sweating or palpitation or chest pain , not related to exertion , Patient came to ER where she was noted to be in atrial fibrillation with rapid rate  , patient says her blood pressure was very labile was changing the doses of medication , however her blood pressure was very high , along with elevated heart rate which warranted to come to ER     Patient was in atrial fibrillation , converted to sinus around 2.55 PM  while i was speaking to patient       Patient      PAST MEDICAL & SURGICAL HISTORY:  Inflammation of ankle joint, right  Dyslipidemia  Chronic Glaucoma  Hypothyroidism  HTN (Hypertension)  S/P cataract extraction: left 2014  S/P ORIF (open reduction internal fixation) fracture: right ankle      Allergies    No Known Allergies    Intolerances    Avelox (Nausea)  Dynabac (Nausea)  Levaquin (Nausea)      SOCIAL HISTORY: no smoker  drink 1- 2 glasses of wine     FAMILY HISTORY: No CAD   Family history of lung cancer      MEDICATIONS:    Home Medications:  acetaminophen 325 mg oral tablet: 2 tab(s) orally every 6 hours, As needed, Mild Pain (15 Aug 2015 07:39)  amLODIPine 5 mg oral tablet: 1 tab(s) orally once a day (14 Aug 2015 11:40)  Atacand 32 mg oral tablet: 1 tab(s) orally once a day (14 Aug 2015 11:40)  atorvastatin 10 mg oral tablet: 1 tab(s) orally once a day (at bedtime) (14 Aug 2015 11:40)  Cosopt 2.23%-0.68% ophthalmic solution: 1 drop(s) to each affected eye 2 times a day (14 Aug 2015 11:40)  latanoprost 0.005% ophthalmic solution: 1 drop(s) to each affected eye once a day (at bedtime) (14 Aug 2015 11:40)  levothyroxine 50 mcg (0.05 mg) oral capsule: 1 cap(s) orally once a day (14 Aug 2015 11:40)    MEDICATIONS  (STANDING):    MEDICATIONS  (PRN):      REVIEW OF SYSTEMS:  as above other wise   CONSTITUTIONAL: No weakness, fevers or chills  EYES/ENT: No visual changes;  No vertigo or throat pain   NECK: No pain or stiffness  RESPIRATORY: No cough, wheezing, hemoptysis; No shortness of breath  CARDIOVASCULAR: No chest pain or palpitations  GASTROINTESTINAL: No abdominal or epigastric pain. No nausea, vomiting, or hematemesis; No diarrhea or constipation. No melena or hematochezia.  GENITOURINARY: No dysuria, frequency or hematuria  NEUROLOGICAL: No numbness or weakness  SKIN: No itching, burning, rashes, or lesions   All other review of systems is negative unless indicated above    Vital Signs Last 24 Hrs  T(C): 36.4 (15 Oct 2019 10:08), Max: 36.4 (15 Oct 2019 10:08)  T(F): 97.5 (15 Oct 2019 10:08), Max: 97.5 (15 Oct 2019 10:08)  HR: 98 (15 Oct 2019 14:43) (96 - 116)  BP: 138/63 (15 Oct 2019 14:43) (138/63 - 172/78)  BP(mean): --  RR: 21 (15 Oct 2019 14:43) (18 - 21)  SpO2: 97% (15 Oct 2019 14:43) (96% - 97%)    I&O's Summary      PHYSICAL EXAM:    Constitutional: NAD, awake and alert, well-developed  HEENT: PERR, EOMI,  No oral cyananosis.  Neck:  supple,  No JVD  Respiratory: Breath sounds are clear bilaterally, No wheezing, rales or rhonchi  Cardiovascular: S1 and S2, regular rate and rhythm, no Murmurs, gallops or rubs  Gastrointestinal: Bowel Sounds present, soft, nontender.   Extremities: No peripheral edema. No clubbing or cyanosis.  Vascular: 2+ peripheral pulses  Neurological: A/O x 3, no focal deficits  Musculoskeletal: no calf tenderness.  Skin: No rashes.      LABS: All Labs Reviewed:                        14.7   6.09  )-----------( 313      ( 15 Oct 2019 11:20 )             43.1     15 Oct 2019 11:20    138    |  100    |  8      ----------------------------<  116    4.7     |  33     |  0.57     Ca    9.7        15 Oct 2019 11:20  Mg     2.1       15 Oct 2019 11:20    TPro  7.6    /  Alb  3.9    /  TBili  0.5    /  DBili  x      /  AST  18     /  ALT  23     /  AlkPhos  50     15 Oct 2019 11:20    PT/INR - ( 15 Oct 2019 11:20 )   PT: 11.4 sec;   INR: 1.04 ratio         PTT - ( 15 Oct 2019 11:20 )  PTT:31.9 sec  CARDIAC MARKERS ( 15 Oct 2019 11:20 )  .000 ng/mL / x     / x     / x     / x          Blood Culture:         RADIOLOGY/EKG: atrial fibrillation 104 BPM     Monitor   Afib with episodes of rapid rate

## 2019-10-15 NOTE — ED PROVIDER NOTE - CLINICAL SUMMARY MEDICAL DECISION MAKING FREE TEXT BOX
For information on Fall & Injury Prevention, visit [email or web address]
pt with new onset a-fib. will do labs, ekg, cxr,5mg cardizem, consult cardio, admit.

## 2019-10-15 NOTE — ED PROVIDER NOTE - PROGRESS NOTE DETAILS
[facility] WALKER [facility] ED Attending Dr. Snider: 86 y/o female with PMHx of glaucoma, hyperthyroidism, HTN on Amlodipine, Candesartan presents to the ED c/o lightheadedness x few weeks. Notes that BP has been elevated. Recently, cardiologist Dr. Worthy's halved Amlodipine and Candesartan secondary to pt experiencing lightheaded. Pt was on heart, BP monitor for 24 hours which did not have any remarkable findings. Denies palpitations, CP, SOB, pedal edema. No known hx of Afib. PCP/Dr. Allen.   Physical: no distress. 172/78.  irregular. afebrile. heart S1S2 irregular rapid. lungs clear. abd soft. extremities no edema. neuro intact.   plan new onset afib. IV Cardizem, cardio consult, possible admission. dr palla cardio will see pt in ed. ángela admit to medicine.

## 2019-10-15 NOTE — ED PROVIDER NOTE - OBJECTIVE STATEMENT
pmd: brand  cardio: mike pt is a 84yo female with pmhx of htn presents with palpitations x today. pt reports this morning she took her bp and it was "very high" 200's over something with associated palpitations. pt consulted cardio who did not call back so she came to ed. pt reports she has been lightheaded for months, currently changing bp medications.   pmd: brand  cardio: mike pt is a 84yo female with pmhx of htn, hypothyroid,  presents with palpitations x today. pt reports this morning she took her bp and it was "very high" 200's over something with associated palpitations. pt consulted cardio who did not call back so she came to ed. pt reports she has been lightheaded for months, currently changing bp medications.   pmd: brand  cardio: mike

## 2019-10-16 ENCOUNTER — TRANSCRIPTION ENCOUNTER (OUTPATIENT)
Age: 84
End: 2019-10-16

## 2019-10-16 DIAGNOSIS — Z29.9 ENCOUNTER FOR PROPHYLACTIC MEASURES, UNSPECIFIED: ICD-10-CM

## 2019-10-16 LAB
ANION GAP SERPL CALC-SCNC: 5 MMOL/L — SIGNIFICANT CHANGE UP (ref 5–17)
BUN SERPL-MCNC: 9 MG/DL — SIGNIFICANT CHANGE UP (ref 7–23)
CALCIUM SERPL-MCNC: 8.8 MG/DL — SIGNIFICANT CHANGE UP (ref 8.4–10.5)
CHLORIDE SERPL-SCNC: 104 MMOL/L — SIGNIFICANT CHANGE UP (ref 96–108)
CO2 SERPL-SCNC: 30 MMOL/L — SIGNIFICANT CHANGE UP (ref 22–31)
CREAT SERPL-MCNC: 0.6 MG/DL — SIGNIFICANT CHANGE UP (ref 0.5–1.3)
GLUCOSE SERPL-MCNC: 93 MG/DL — SIGNIFICANT CHANGE UP (ref 70–99)
HCT VFR BLD CALC: 38.3 % — SIGNIFICANT CHANGE UP (ref 34.5–45)
HGB BLD-MCNC: 12.8 G/DL — SIGNIFICANT CHANGE UP (ref 11.5–15.5)
MCHC RBC-ENTMCNC: 31.3 PG — SIGNIFICANT CHANGE UP (ref 27–34)
MCHC RBC-ENTMCNC: 33.4 GM/DL — SIGNIFICANT CHANGE UP (ref 32–36)
MCV RBC AUTO: 93.6 FL — SIGNIFICANT CHANGE UP (ref 80–100)
NRBC # BLD: 0 /100 WBCS — SIGNIFICANT CHANGE UP (ref 0–0)
PLATELET # BLD AUTO: 268 K/UL — SIGNIFICANT CHANGE UP (ref 150–400)
POTASSIUM SERPL-MCNC: 4 MMOL/L — SIGNIFICANT CHANGE UP (ref 3.5–5.3)
POTASSIUM SERPL-SCNC: 4 MMOL/L — SIGNIFICANT CHANGE UP (ref 3.5–5.3)
RBC # BLD: 4.09 M/UL — SIGNIFICANT CHANGE UP (ref 3.8–5.2)
RBC # FLD: 12.9 % — SIGNIFICANT CHANGE UP (ref 10.3–14.5)
SODIUM SERPL-SCNC: 139 MMOL/L — SIGNIFICANT CHANGE UP (ref 135–145)
T4 AB SER-ACNC: 8.5 UG/DL — SIGNIFICANT CHANGE UP (ref 4.6–12)
TSH SERPL-MCNC: 1.08 UIU/ML — SIGNIFICANT CHANGE UP (ref 0.27–4.2)
WBC # BLD: 6.64 K/UL — SIGNIFICANT CHANGE UP (ref 3.8–10.5)
WBC # FLD AUTO: 6.64 K/UL — SIGNIFICANT CHANGE UP (ref 3.8–10.5)

## 2019-10-16 PROCEDURE — 99233 SBSQ HOSP IP/OBS HIGH 50: CPT

## 2019-10-16 RX ORDER — METOPROLOL TARTRATE 50 MG
75 TABLET ORAL DAILY
Refills: 0 | Status: DISCONTINUED | OUTPATIENT
Start: 2019-10-16 | End: 2019-10-16

## 2019-10-16 RX ORDER — METOPROLOL TARTRATE 50 MG
25 TABLET ORAL DAILY
Refills: 0 | Status: DISCONTINUED | OUTPATIENT
Start: 2019-10-16 | End: 2019-10-17

## 2019-10-16 RX ORDER — DOCUSATE SODIUM 100 MG
100 CAPSULE ORAL DAILY
Refills: 0 | Status: DISCONTINUED | OUTPATIENT
Start: 2019-10-16 | End: 2019-10-17

## 2019-10-16 RX ADMIN — APIXABAN 5 MILLIGRAM(S): 2.5 TABLET, FILM COATED ORAL at 06:17

## 2019-10-16 RX ADMIN — AMLODIPINE BESYLATE 2.5 MILLIGRAM(S): 2.5 TABLET ORAL at 06:16

## 2019-10-16 RX ADMIN — Medication 5 MILLIGRAM(S): at 22:08

## 2019-10-16 RX ADMIN — Medication 100 MILLIGRAM(S): at 17:17

## 2019-10-16 RX ADMIN — Medication 75 MICROGRAM(S): at 06:17

## 2019-10-16 RX ADMIN — ATORVASTATIN CALCIUM 10 MILLIGRAM(S): 80 TABLET, FILM COATED ORAL at 22:08

## 2019-10-16 RX ADMIN — APIXABAN 5 MILLIGRAM(S): 2.5 TABLET, FILM COATED ORAL at 17:17

## 2019-10-16 RX ADMIN — Medication 25 MILLIGRAM(S): at 06:16

## 2019-10-16 NOTE — DISCHARGE NOTE PROVIDER - CARE PROVIDER_API CALL
Gerardo Kemp)  Cardiovascular Disease  [address] [place] [address]  Phone: [phone]  Fax: [phone]  Follow Up Time:     Basia Allen  Phone: (   )    -  Fax: (   )    -  Follow Up Time:

## 2019-10-16 NOTE — PHYSICAL THERAPY INITIAL EVALUATION ADULT - PERTINENT HX OF CURRENT PROBLEM, REHAB EVAL
this is a 84 y/o female with past hx of progressively worsening dizziness and hld, htn, genralized weakness, hypothyroidism, gerd presents to ED after she was feeling very light headed and dizzy. the patient narrates that she has been experiencing this for the last many days but it got worse today.

## 2019-10-16 NOTE — PROGRESS NOTE ADULT - PROBLEM SELECTOR PLAN 1
admitted to telemetry  Cardio switched to long acting metoprolol qd  on elliquis b/c of chadvasc score

## 2019-10-16 NOTE — DISCHARGE NOTE PROVIDER - HOSPITAL COURSE
HPI:    this is a 84 y/o female with past hx of progressively worsening dizziness and hld, htn, genralized weakness, hypothyroidism, gerd presents to ED after she was feeling very light headed and dizzy. the patient narrates that she has been experiencing this for the last many days but it got worse today. she has had neuro work up done and that has been negative. seen  y ent also. last echo was in  office in 2018 october.    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 she has been taking levothyroxine for years and has no recent t4 tested as per her.    she was found to be in afib w rvr in ed and was given cardizem. she was on holter for a year in 2017 also with no event as per her. (15 Oct 2019 16:18) HPI:    this is a 86 y/o female with past hx of progressively worsening dizziness and hld, htn, genralized weakness, hypothyroidism, gerd presents to ED after she was feeling very light headed and dizzy. the patient narrates that she has been experiencing this for the last many days but it got worse today. she has had neuro work up done and that has been negative. seen  by ent also. last echo was in  office in 2018 october.    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 she has been taking levothyroxine for years and has no recent t4 tested as per her.    she was found to be in afib w rvr in ed and was given cardizem. she was on holter for a year in 2017 also with no event as per her. (15 Oct 2019 16:18)        Pt was admitted for Afib.  She was started on elliquis b/c of her marcelo vasc score.      Metoprolol dose was adjusted because of bradycardia.  She is currently stable and asymptomatic and also currently in sinus rhythm.         TSH levels were WNL.     Neurology evaluated pt, did not think it was related to any neuro event.         Vital Signs Last 24 Hrs    T(C): 36.3 (17 Oct 2019 05:32), Max: 36.9 (16 Oct 2019 16:55)    T(F): 97.4 (17 Oct 2019 05:32), Max: 98.5 (16 Oct 2019 16:55)    HR: 78 (17 Oct 2019 08:53) (59 - 78)    BP: 145/70 (17 Oct 2019 08:53) (105/58 - 151/67)    BP(mean): --    RR: 18 (17 Oct 2019 05:32) (17 - 18)    SpO2: 97% (17 Oct 2019 05:32) (94% - 99%)        GENERAL: NAD, well-groomed, well-developed    HEAD:  Atraumatic, Normocephalic    EYES: EOMI, PERRLA, conjunctiva and sclera clear    ENMT: No tonsillar erythema, exudates, or enlargement; Moist mucous membranes    NECK: Supple, No JVD, Normal thyroid    CHEST/LUNG: Clear to percussion bilaterally; No rales, rhonchi, wheezing, or rubs    HEART: Regular rate and rhythm; No murmurs, rubs, or gallops    ABDOMEN: Soft, Nontender, Nondistended; Bowel sounds present    EXTREMITIES:  2+ Peripheral Pulses, No clubbing, cyanosis, or edema    LYMPH: No lymphadenopathy noted    NERVOUS SYSTEM:  Alert & Oriented X3, Good concentration; Motor Strength 5/5 B/L upper and lower extremities; DTRs 2+ intact and symmetric        Pt will follow up with Dr. Kemp as outpatient. HPI:    this is a 84 y/o female with past hx of progressively worsening dizziness and hld, htn, genralized weakness, hypothyroidism, gerd presents to ED after she was feeling very light headed and dizzy. the patient narrates that she has been experiencing this for the last many days but it got worse today. she has had neuro work up done and that has been negative. seen  by ent also. last echo was in  office in 2018 october.    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 she has been taking levothyroxine for years and has no recent t4 tested as per her.    she was found to be in afib w rvr in ed and was given cardizem. she was on holter for a year in 2017 also with no event as per her. (15 Oct 2019 16:18)        Pt was admitted for Afib.  She was started on elliquis b/c of her marcelo vasc score.      Metoprolol dose was adjusted because of bradycardia.  She is currently stable and asymptomatic and also currently in sinus rhythm.         TSH levels were WNL.     Neurology evaluated pt, did not think it was related to any neuro event.         Vital Signs Last 24 Hrs    T(C): 36.3 (17 Oct 2019 05:32), Max: 36.9 (16 Oct 2019 16:55)    T(F): 97.4 (17 Oct 2019 05:32), Max: 98.5 (16 Oct 2019 16:55)    HR: 78 (17 Oct 2019 08:53) (59 - 78)    BP: 145/70 (17 Oct 2019 08:53) (105/58 - 151/67)    BP(mean): --    RR: 18 (17 Oct 2019 05:32) (17 - 18)    SpO2: 97% (17 Oct 2019 05:32) (94% - 99%)        GENERAL: NAD, well-groomed, well-developed    HEAD:  Atraumatic, Normocephalic    EYES: EOMI, PERRLA, conjunctiva and sclera clear    ENMT: No tonsillar erythema, exudates, or enlargement; Moist mucous membranes    NECK: Supple, No JVD, Normal thyroid    CHEST/LUNG: Clear to percussion bilaterally; No rales, rhonchi, wheezing, or rubs    HEART: Regular rate and rhythm; No murmurs, rubs, or gallops    ABDOMEN: Soft, Nontender, Nondistended; Bowel sounds present    EXTREMITIES:  2+ Peripheral Pulses, No clubbing, cyanosis, or edema    LYMPH: No lymphadenopathy noted    NERVOUS SYSTEM:  Alert & Oriented X3, Good concentration; Motor Strength 5/5 B/L upper and lower extremities; DTRs 2+ intact and symmetric        Pt will follow up with Dr. Kemp as outpatient.     We will continue with amlodopine and metoprolol.

## 2019-10-16 NOTE — PROGRESS NOTE ADULT - PROBLEM SELECTOR PLAN 1
Improved with metoprolol. Change to long acting 75 mg QD.  Continue Eliquis for elevated marcelo vasc score. Improved with metoprolol. Change to long acting 25 mg QD.  Continue Eliquis for elevated marcelo vasc score.

## 2019-10-16 NOTE — CONSULT NOTE ADULT - SUBJECTIVE AND OBJECTIVE BOX
dizziness lightheadedness with AFIB  tele   monitor sbp  AC as needed   neurologic wise appears stable

## 2019-10-16 NOTE — PROGRESS NOTE ADULT - SUBJECTIVE AND OBJECTIVE BOX
HPI:  this is a 84 y/o female with past hx of progressively worsening dizziness and hld, htn, genralized weakness, hypothyroidism, gerd presents to ED after she was feeling very light headed and dizzy. the patient narrates that she has been experiencing this for the last many days but it got worse today. she has had neuro work up done and that has been negative. seen  y ent also. last echo was in  office in 2018 october.  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 she has been taking levothyroxine for years and has no recent t4 tested as per her.  she was found to be in afib w rvr in ed and was given cardizem. she was on holter for a year in 2017 also with no event as per her. (15 Oct 2019 16:18)    Patient is a 85y old  Female who presents with a chief complaint of dizziness with afib w rvr (16 Oct 2019 11:02)      INTERVAL HPI/OVERNIGHT EVENTS:    Pt had atrial tach this AM.  Reviewed by cardiology.  He changed medication from metoprolol tatrate to long acting succinate.  Pt was asymptomatic.      MEDICATIONS  (STANDING):  amitriptyline 5 milliGRAM(s) Oral daily  amLODIPine   Tablet 2.5 milliGRAM(s) Oral daily  apixaban 5 milliGRAM(s) Oral every 12 hours  atorvastatin 10 milliGRAM(s) Oral at bedtime  famotidine    Tablet 20 milliGRAM(s) Oral two times a day  levothyroxine 75 MICROGram(s) Oral daily  metoprolol succinate ER 25 milliGRAM(s) Oral daily    MEDICATIONS  (PRN):      Allergies    No Known Allergies    Intolerances    Avelox (Nausea)  Dynabac (Nausea)  Levaquin (Nausea)      REVIEW OF SYSTEMS:  CONSTITUTIONAL: No fever, weight loss, or fatigue  RESPIRATORY: No cough, wheezing, chills or hemoptysis; No shortness of breath  CARDIOVASCULAR: No chest pain, palpitations, lightheadedness, or leg swelling  GASTROINTESTINAL: No abdominal or epigastric pain. No nausea, vomiting, or hematemesis; No diarrhea or constipation. No melena or hematochezia.  GENITOURINARY: No dysuria, frequency, hematuria, or incontinence  NEUROLOGICAL: No headaches, memory loss, vertigo, loss of strength, numbness, or tremors  SKIN: No itching, burning, rashes, or lesions       Vital Signs Last 24 Hrs  T(C): 36.7 (16 Oct 2019 09:25), Max: 36.9 (16 Oct 2019 00:36)  T(F): 98.1 (16 Oct 2019 09:25), Max: 98.4 (16 Oct 2019 00:36)  HR: 65 (16 Oct 2019 11:23) (60 - 98)  BP: 117/65 (16 Oct 2019 11:23) (110/62 - 140/60)  BP(mean): --  RR: 18 (16 Oct 2019 09:25) (18 - 21)  SpO2: 99% (16 Oct 2019 09:25) (96% - 99%)    PHYSICAL EXAM:  GENERAL: NAD, well-groomed, well-developed  HEAD:  Atraumatic, Normocephalic  EYES: EOMI, PERRLA, conjunctiva and sclera clear  ENMT: Moist mucous membranes, Good dentition, No lesions; No tonsillar erythema, exudates, or enlargement  NECK: Supple, No JVD, Normal thyroid  NERVOUS SYSTEM:  Alert & Oriented X3, Good concentration; All 4 extremities mobile, no gross sensory deficits.   CHEST/LUNG: Clear to auscultation bilaterally; No rales, rhonchi, wheezing, or rubs  HEART: Cardiovascular: S1 and S2, regular rate and rhythm, 1/6 EVA.  ABDOMEN: Soft, Nontender, Nondistended; Bowel sounds present  EXTREMITIES:  2+ Peripheral Pulses, No clubbing, cyanosis, or edema  LYMPH: No lymphadenopathy noted  SKIN: No rashes or lesions    LABS:                        12.8   6.64  )-----------( 268      ( 16 Oct 2019 07:19 )             38.3     16 Oct 2019 07:19    139    |  104    |  9      ----------------------------<  93     4.0     |  30     |  0.60     Ca    8.8        16 Oct 2019 07:19      PT/INR - ( 15 Oct 2019 11:20 )   PT: 11.4 sec;   INR: 1.04 ratio         PTT - ( 15 Oct 2019 11:20 )  PTT:31.9 sec    CAPILLARY BLOOD GLUCOSE          RADIOLOGY & ADDITIONAL TESTS:    Imaging Personally Reviewed:  [ ] YES     Consultant(s) Notes Reviewed:      Care Discussed with Consultants/Other Providers:    Advanced Directives: [ ] DNR  [ ] No feeding tube  [ ] MOLST in chart  [ ] MOLST completed today  [ ] Unknown

## 2019-10-16 NOTE — PHYSICAL THERAPY INITIAL EVALUATION ADULT - CRITERIA FOR SKILLED THERAPEUTIC INTERVENTIONS
d/c home no skilled PT/DME needs/anticipated equipment needs at discharge/anticipated discharge recommendation

## 2019-10-16 NOTE — DISCHARGE NOTE NURSING/CASE MANAGEMENT/SOCIAL WORK - NSSCNAMETXT_GEN_ALL_CORE
[facility] [phone] or  [phone]  Nurse to visit the day after hospital discharge. Please contact the home care agency at the above phone number if you have not heard from them by 12 noon on the day after your hospital discharge.

## 2019-10-16 NOTE — PROVIDER CONTACT NOTE (OTHER) - ASSESSMENT
AxO x4, follows commands. Denies chest pain, SOB, dizziness, light-headedness; not diaphoretic, not pale. No tingling to B/L extremities. Was lying supine in bed at time of episode. Received Norvasc 2.5 mg PO this AM at 6.

## 2019-10-16 NOTE — PHYSICAL THERAPY INITIAL EVALUATION ADULT - ADDITIONAL COMMENTS
pvt home with 3 SHASHA w/ HR, stays on the main floor, laudry in basement 13 steps with HR. Pt has DANETTE palacios. Pt drives

## 2019-10-16 NOTE — DISCHARGE NOTE NURSING/CASE MANAGEMENT/SOCIAL WORK - PATIENT PORTAL LINK FT
You can access the FollowMyHealth Patient Portal offered by [facility] by registering at the following website: [email or web address]. By joining [facility]’s FollowMyHealth portal, you will also be able to view your health information using other applications (apps) compatible with our system.

## 2019-10-16 NOTE — PROVIDER CONTACT NOTE (OTHER) - RECOMMENDATIONS
Dysrhythmia identified as run of atrial tachycardia per Dr. Stiles's review. Start metoprolol 25 mg XL PO daily with hold parameters.

## 2019-10-16 NOTE — PROVIDER CONTACT NOTE (OTHER) - SITUATION
At 08:03:36, cardiac monitor revealed episode of sinus bradycardia with At 08:03:36, cardiac monitor revealed episode of approximately 6 beats of atrial rhythm, unclear if return to a-fib. Also sinus bradycardia noted.

## 2019-10-16 NOTE — PROGRESS NOTE ADULT - SUBJECTIVE AND OBJECTIVE BOX
CHIEF COMPLAINT: worsened dizziness this morning     HPI: 85 year old female with hx of HTN , Hypothyroidism  hyperlipidemia , glaucoma who came to hospital with complaining of not feeling well , dizziness this morning when she woke up which was worse than she used to have for many years , patient was  hospitalized for that in the past without obvious etiology , patient says it occurs in the morning time ,not associated with sweating or palpitation or chest pain , not related to exertion , Patient came to ER where she was noted to be in atrial fibrillation with rapid rate  , patient says her blood pressure was very labile was changing the doses of medication , however her blood pressure was very high , along with elevated heart rate which warranted to come to ER     Patient was in atrial fibrillation , converted to sinus around 2.55 PM  while i was speaking to patient       Patient      10/16/19: feels better and less dizz.  Has maintained NSR on TELE with rare runs of Atach.  No CP or SOB    PAST MEDICAL & SURGICAL HISTORY:  Inflammation of ankle joint, right  Dyslipidemia  Chronic Glaucoma  Hypothyroidism  HTN (Hypertension)  S/P cataract extraction: left   S/P ORIF (open reduction internal fixation) fracture: right ankle      Allergies    No Known Allergies    Intolerances    Avelox (Nausea)  Dynabac (Nausea)  Levaquin (Nausea)      SOCIAL HISTORY: no smoker  drink 1- 2 glasses of wine     FAMILY HISTORY: No CAD   Family history of lung cancer    MEDICATIONS  (STANDING):  amitriptyline 5 milliGRAM(s) Oral daily  amLODIPine   Tablet 2.5 milliGRAM(s) Oral daily  apixaban 5 milliGRAM(s) Oral every 12 hours  atorvastatin 10 milliGRAM(s) Oral at bedtime  famotidine    Tablet 20 milliGRAM(s) Oral two times a day  levothyroxine 75 MICROGram(s) Oral daily  metoprolol tartrate 25 milliGRAM(s) Oral every 12 hours    MEDICATIONS  (PRN):      Vital Signs Last 24 Hrs  T(C): 36.9 (16 Oct 2019 05:45), Max: 36.9 (16 Oct 2019 00:36)  T(F): 98.4 (16 Oct 2019 05:45), Max: 98.4 (16 Oct 2019 00:36)  HR: 70 (16 Oct 2019 05:45) (61 - 116)  BP: 134/79 (16 Oct 2019 05:45) (110/62 - 172/78)  BP(mean): --  RR: 18 (16 Oct 2019 05:45) (18 - 21)  SpO2: 96% (16 Oct 2019 05:45) (96% - 97%)    I&O's Detail    15 Oct 2019 07:01  -  16 Oct 2019 07:00  --------------------------------------------------------  IN:  Total IN: 0 mL    OUT:    Voided: 350 mL  Total OUT: 350 mL    Total NET: -350 mL          Daily Height in cm: 165.1 (15 Oct 2019 10:08)    Daily Weight in k (16 Oct 2019 05:45)  PHYSICAL EXAM:    Constitutional: NAD, awake and alert, well-developed  HEENT: PERR, EOMI,  No oral cyananosis.  Neck:  supple,  No JVD  Respiratory: Breath sounds are clear bilaterally, No wheezing, rales or rhonchi  Cardiovascular: S1 and S2, regular rate and rhythm, 1/6 EVA.  Gastrointestinal: Bowel Sounds present, soft, nontender.   Extremities: No peripheral edema. No clubbing or cyanosis.  Vascular: 2+ peripheral pulses  Neurological: A/O x 3, no focal deficits  Musculoskeletal: no calf tenderness.  Skin: No rashes.      LABS: All Labs Reviewed:                                   12.8   6.64  )-----------( 268      ( 16 Oct 2019 07:19 )             38.3     10-16    139  |  104  |  9   ----------------------------<  93  4.0   |  30  |  0.60    Ca    8.8      16 Oct 2019 07:19  Mg     2.1     10-15    TPro  7.6  /  Alb  3.9  /  TBili  0.5  /  DBili  x   /  AST  18  /  ALT  23  /  AlkPhos  50  10-15    CARDIAC MARKERS ( 15 Oct 2019 18:57 )  .000 ng/mL / x     / x     / x     / x      CARDIAC MARKERS ( 15 Oct 2019 11:20 )  .000 ng/mL / x     / x     / x     / x          LIVER FUNCTIONS - ( 15 Oct 2019 11:20 )  Alb: 3.9 g/dL / Pro: 7.6 g/dL / ALK PHOS: 50 U/L / ALT: 23 U/L DA / AST: 18 U/L / GGT: x           PT/INR - ( 15 Oct 2019 11:20 )   PT: 11.4 sec;   INR: 1.04 ratio         PTT - ( 15 Oct 2019 11:20 )  PTT:31.9 sec    Echo result pending. CHIEF COMPLAINT: worsened dizziness this morning     HPI: 85 year old female with hx of HTN , Hypothyroidism  hyperlipidemia , glaucoma who came to hospital with complaining of not feeling well , dizziness this morning when she woke up which was worse than she used to have for many years , patient was  hospitalized for that in the past without obvious etiology , patient says it occurs in the morning time ,not associated with sweating or palpitation or chest pain , not related to exertion , Patient came to ER where she was noted to be in atrial fibrillation with rapid rate  , patient says her blood pressure was very labile was changing the doses of medication , however her blood pressure was very high , along with elevated heart rate which warranted to come to ER     Patient was in atrial fibrillation , converted to sinus around 2.55 PM  while i was speaking to patient       Patient      10/16/19: feels better and less dizz.  Has maintained NSR on TELE with rare runs of Atach.  No CP or SOB    PAST MEDICAL & SURGICAL HISTORY:  Inflammation of ankle joint, right  Dyslipidemia  Chronic Glaucoma  Hypothyroidism  HTN (Hypertension)  S/P cataract extraction: left   S/P ORIF (open reduction internal fixation) fracture: right ankle      Allergies    No Known Allergies    Intolerances    Avelox (Nausea)  Dynabac (Nausea)  Levaquin (Nausea)      SOCIAL HISTORY: no smoker  drink 1- 2 glasses of wine     FAMILY HISTORY: No CAD   Family history of lung cancer    MEDICATIONS  (STANDING):  amitriptyline 5 milliGRAM(s) Oral daily  amLODIPine   Tablet 2.5 milliGRAM(s) Oral daily  apixaban 5 milliGRAM(s) Oral every 12 hours  atorvastatin 10 milliGRAM(s) Oral at bedtime  famotidine    Tablet 20 milliGRAM(s) Oral two times a day  levothyroxine 75 MICROGram(s) Oral daily  metoprolol tartrate 25 milliGRAM(s) Oral every 12 hours    MEDICATIONS  (PRN):      Vital Signs Last 24 Hrs  T(C): 36.9 (16 Oct 2019 05:45), Max: 36.9 (16 Oct 2019 00:36)  T(F): 98.4 (16 Oct 2019 05:45), Max: 98.4 (16 Oct 2019 00:36)  HR: 70 (16 Oct 2019 05:45) (61 - 116)  BP: 134/79 (16 Oct 2019 05:45) (110/62 - 172/78)  BP(mean): --  RR: 18 (16 Oct 2019 05:45) (18 - 21)  SpO2: 96% (16 Oct 2019 05:45) (96% - 97%)    I&O's Detail    15 Oct 2019 07:01  -  16 Oct 2019 07:00  --------------------------------------------------------  IN:  Total IN: 0 mL    OUT:    Voided: 350 mL  Total OUT: 350 mL    Total NET: -350 mL          Daily Height in cm: 165.1 (15 Oct 2019 10:08)    Daily Weight in k (16 Oct 2019 05:45)  PHYSICAL EXAM:    Constitutional: NAD, awake and alert, well-developed  HEENT: PERR, EOMI,  No oral cyananosis.  Neck:  supple,  No JVD  Respiratory: Breath sounds are clear bilaterally, No wheezing, rales or rhonchi  Cardiovascular: S1 and S2, regular rate and rhythm, 1/6 EVA.  Gastrointestinal: Bowel Sounds present, soft, nontender.   Extremities: No peripheral edema. No clubbing or cyanosis.  Vascular: 2+ peripheral pulses  Neurological: A/O x 3, no focal deficits  Musculoskeletal: no calf tenderness.  Skin: No rashes.      LABS: All Labs Reviewed:                                   12.8   6.64  )-----------( 268      ( 16 Oct 2019 07:19 )             38.3     10-16    139  |  104  |  9   ----------------------------<  93  4.0   |  30  |  0.60    Ca    8.8      16 Oct 2019 07:19  Mg     2.1     10-15    TPro  7.6  /  Alb  3.9  /  TBili  0.5  /  DBili  x   /  AST  18  /  ALT  23  /  AlkPhos  50  10-15    CARDIAC MARKERS ( 15 Oct 2019 18:57 )  .000 ng/mL / x     / x     / x     / x      CARDIAC MARKERS ( 15 Oct 2019 11:20 )  .000 ng/mL / x     / x     / x     / x          LIVER FUNCTIONS - ( 15 Oct 2019 11:20 )  Alb: 3.9 g/dL / Pro: 7.6 g/dL / ALK PHOS: 50 U/L / ALT: 23 U/L DA / AST: 18 U/L / GGT: x           PT/INR - ( 15 Oct 2019 11:20 )   PT: 11.4 sec;   INR: 1.04 ratio         PTT - ( 15 Oct 2019 11:20 )  PTT:31.9 sec    Echo result pending.  PRelim: NL LV FX. No gross overt valvular abnormalities.

## 2019-10-16 NOTE — PROVIDER CONTACT NOTE (OTHER) - BACKGROUND
Patient admitted for dizziness, found to have new onset a-fib. PMH includes HLD chronic glaucoma hypothyroidism HTN.

## 2019-10-16 NOTE — DISCHARGE NOTE PROVIDER - NSDCCPCAREPLAN_GEN_ALL_CORE_FT
PRINCIPAL DISCHARGE DIAGNOSIS  Diagnosis: Atrial fibrillation  Assessment and Plan of Treatment:       SECONDARY DISCHARGE DIAGNOSES  Diagnosis: Palpitations  Assessment and Plan of Treatment:

## 2019-10-16 NOTE — DISCHARGE NOTE PROVIDER - CARE PROVIDERS DIRECT ADDRESSES
[email or web address]

## 2019-10-17 VITALS — DIASTOLIC BLOOD PRESSURE: 66 MMHG | SYSTOLIC BLOOD PRESSURE: 156 MMHG | HEART RATE: 68 BPM

## 2019-10-17 LAB
ALBUMIN SERPL ELPH-MCNC: 3.6 G/DL — SIGNIFICANT CHANGE UP (ref 3.3–5)
ALP SERPL-CCNC: 44 U/L — SIGNIFICANT CHANGE UP (ref 30–120)
ALT FLD-CCNC: 22 U/L DA — SIGNIFICANT CHANGE UP (ref 10–60)
ANION GAP SERPL CALC-SCNC: 6 MMOL/L — SIGNIFICANT CHANGE UP (ref 5–17)
AST SERPL-CCNC: 15 U/L — SIGNIFICANT CHANGE UP (ref 10–40)
BILIRUB SERPL-MCNC: 0.4 MG/DL — SIGNIFICANT CHANGE UP (ref 0.2–1.2)
BUN SERPL-MCNC: 11 MG/DL — SIGNIFICANT CHANGE UP (ref 7–23)
CALCIUM SERPL-MCNC: 9 MG/DL — SIGNIFICANT CHANGE UP (ref 8.4–10.5)
CHLORIDE SERPL-SCNC: 102 MMOL/L — SIGNIFICANT CHANGE UP (ref 96–108)
CO2 SERPL-SCNC: 29 MMOL/L — SIGNIFICANT CHANGE UP (ref 22–31)
CREAT SERPL-MCNC: 0.6 MG/DL — SIGNIFICANT CHANGE UP (ref 0.5–1.3)
GLUCOSE SERPL-MCNC: 101 MG/DL — HIGH (ref 70–99)
HCT VFR BLD CALC: 41.8 % — SIGNIFICANT CHANGE UP (ref 34.5–45)
HGB BLD-MCNC: 13.9 G/DL — SIGNIFICANT CHANGE UP (ref 11.5–15.5)
MCHC RBC-ENTMCNC: 30.8 PG — SIGNIFICANT CHANGE UP (ref 27–34)
MCHC RBC-ENTMCNC: 33.3 GM/DL — SIGNIFICANT CHANGE UP (ref 32–36)
MCV RBC AUTO: 92.7 FL — SIGNIFICANT CHANGE UP (ref 80–100)
NRBC # BLD: 0 /100 WBCS — SIGNIFICANT CHANGE UP (ref 0–0)
PLATELET # BLD AUTO: 304 K/UL — SIGNIFICANT CHANGE UP (ref 150–400)
POTASSIUM SERPL-MCNC: 4.4 MMOL/L — SIGNIFICANT CHANGE UP (ref 3.5–5.3)
POTASSIUM SERPL-SCNC: 4.4 MMOL/L — SIGNIFICANT CHANGE UP (ref 3.5–5.3)
PROT SERPL-MCNC: 6.7 G/DL — SIGNIFICANT CHANGE UP (ref 6–8.3)
RBC # BLD: 4.51 M/UL — SIGNIFICANT CHANGE UP (ref 3.8–5.2)
RBC # FLD: 12.8 % — SIGNIFICANT CHANGE UP (ref 10.3–14.5)
SODIUM SERPL-SCNC: 137 MMOL/L — SIGNIFICANT CHANGE UP (ref 135–145)
WBC # BLD: 7.15 K/UL — SIGNIFICANT CHANGE UP (ref 3.8–10.5)
WBC # FLD AUTO: 7.15 K/UL — SIGNIFICANT CHANGE UP (ref 3.8–10.5)

## 2019-10-17 PROCEDURE — 85610 PROTHROMBIN TIME: CPT

## 2019-10-17 PROCEDURE — 93880 EXTRACRANIAL BILAT STUDY: CPT

## 2019-10-17 PROCEDURE — 80053 COMPREHEN METABOLIC PANEL: CPT

## 2019-10-17 PROCEDURE — 99239 HOSP IP/OBS DSCHRG MGMT >30: CPT

## 2019-10-17 PROCEDURE — 84443 ASSAY THYROID STIM HORMONE: CPT

## 2019-10-17 PROCEDURE — 71046 X-RAY EXAM CHEST 2 VIEWS: CPT

## 2019-10-17 PROCEDURE — 85027 COMPLETE CBC AUTOMATED: CPT

## 2019-10-17 PROCEDURE — 85730 THROMBOPLASTIN TIME PARTIAL: CPT

## 2019-10-17 PROCEDURE — 93005 ELECTROCARDIOGRAM TRACING: CPT

## 2019-10-17 PROCEDURE — 80048 BASIC METABOLIC PNL TOTAL CA: CPT

## 2019-10-17 PROCEDURE — 96374 THER/PROPH/DIAG INJ IV PUSH: CPT

## 2019-10-17 PROCEDURE — 84436 ASSAY OF TOTAL THYROXINE: CPT

## 2019-10-17 PROCEDURE — 83735 ASSAY OF MAGNESIUM: CPT

## 2019-10-17 PROCEDURE — 99233 SBSQ HOSP IP/OBS HIGH 50: CPT

## 2019-10-17 PROCEDURE — 36415 COLL VENOUS BLD VENIPUNCTURE: CPT

## 2019-10-17 PROCEDURE — 70450 CT HEAD/BRAIN W/O DYE: CPT

## 2019-10-17 PROCEDURE — 99285 EMERGENCY DEPT VISIT HI MDM: CPT | Mod: 25

## 2019-10-17 PROCEDURE — 93306 TTE W/DOPPLER COMPLETE: CPT

## 2019-10-17 PROCEDURE — 84484 ASSAY OF TROPONIN QUANT: CPT

## 2019-10-17 PROCEDURE — 97161 PT EVAL LOW COMPLEX 20 MIN: CPT

## 2019-10-17 RX ORDER — METOPROLOL TARTRATE 50 MG
1 TABLET ORAL
Qty: 30 | Refills: 0
Start: 2019-10-17

## 2019-10-17 RX ORDER — METOPROLOL TARTRATE 50 MG
1 TABLET ORAL
Qty: 0 | Refills: 0 | DISCHARGE
Start: 2019-10-17

## 2019-10-17 RX ORDER — APIXABAN 2.5 MG/1
1 TABLET, FILM COATED ORAL
Qty: 0 | Refills: 0 | DISCHARGE
Start: 2019-10-17

## 2019-10-17 RX ORDER — APIXABAN 2.5 MG/1
1 TABLET, FILM COATED ORAL
Qty: 60 | Refills: 0
Start: 2019-10-17

## 2019-10-17 RX ADMIN — FAMOTIDINE 20 MILLIGRAM(S): 10 INJECTION INTRAVENOUS at 05:34

## 2019-10-17 RX ADMIN — APIXABAN 5 MILLIGRAM(S): 2.5 TABLET, FILM COATED ORAL at 05:34

## 2019-10-17 RX ADMIN — Medication 25 MILLIGRAM(S): at 05:35

## 2019-10-17 RX ADMIN — Medication 75 MICROGRAM(S): at 05:34

## 2019-10-17 RX ADMIN — AMLODIPINE BESYLATE 2.5 MILLIGRAM(S): 2.5 TABLET ORAL at 05:34

## 2019-10-17 NOTE — PROGRESS NOTE ADULT - REASON FOR ADMISSION
dizziness with afib w rvr

## 2019-10-17 NOTE — PROGRESS NOTE ADULT - PROBLEM SELECTOR PLAN 2
Neurology consult recs appreciated  PT evaluated pt  echo and carotid doppler unremarkable
resolved.
resolved.

## 2019-10-17 NOTE — PROGRESS NOTE ADULT - SUBJECTIVE AND OBJECTIVE BOX
CHIEF COMPLAINT: worsened dizziness this morning     HPI: 85 year old female with hx of HTN , Hypothyroidism  hyperlipidemia , glaucoma who came to hospital with complaining of not feeling well , dizziness this morning when she woke up which was worse than she used to have for many years , patient was  hospitalized for that in the past without obvious etiology , patient says it occurs in the morning time ,not associated with sweating or palpitation or chest pain , not related to exertion , Patient came to ER where she was noted to be in atrial fibrillation with rapid rate  , patient says her blood pressure was very labile was changing the doses of medication , however her blood pressure was very high , along with elevated heart rate which warranted to come to ER     Patient was in atrial fibrillation , converted to sinus around 2.55 PM  while i was speaking to patient         10/16/19: feels better and less dizz.  Has maintained NSR on TELE with rare runs of Atach.  No CP or SOB   10/17/19 Patient is feeling fine , no dizziness , monitor sinus bradycardia while asleep , sinus rhythm when she is awake ,  BB was decreased due to bradycardia       PAST MEDICAL & SURGICAL HISTORY:  Inflammation of ankle joint, right  Dyslipidemia  Chronic Glaucoma  Hypothyroidism  HTN (Hypertension)  S/P cataract extraction: left 2014  S/P ORIF (open reduction internal fixation) fracture: right ankle      Allergies    No Known Allergies    Intolerances    Avelox (Nausea)  Dynabac (Nausea)  Levaquin (Nausea)      SOCIAL HISTORY: no smoker  drink 1- 2 glasses of wine     FAMILY HISTORY: No CAD   Family history of lung cancer    MEDICATIONS  (STANDING):  amitriptyline 5 milliGRAM(s) Oral daily  amLODIPine   Tablet 2.5 milliGRAM(s) Oral daily  apixaban 5 milliGRAM(s) Oral every 12 hours  atorvastatin 10 milliGRAM(s) Oral at bedtime  docusate sodium 100 milliGRAM(s) Oral daily  famotidine    Tablet 20 milliGRAM(s) Oral two times a day  levothyroxine 75 MICROGram(s) Oral daily  metoprolol succinate ER 25 milliGRAM(s) Oral daily    MEDICATIONS  (PRN):    Vital Signs Last 24 Hrs  T(C): 36.3 (17 Oct 2019 05:32), Max: 36.9 (16 Oct 2019 16:55)  T(F): 97.4 (17 Oct 2019 05:32), Max: 98.5 (16 Oct 2019 16:55)  HR: 68 (17 Oct 2019 08:28) (59 - 69)  BP: 144/74 (17 Oct 2019 05:32) (105/58 - 151/67)  BP(mean): --  RR: 18 (17 Oct 2019 05:32) (17 - 18)  SpO2: 97% (17 Oct 2019 05:32) (94% - 99%)    I&O's Summary    16 Oct 2019 07:01  -  17 Oct 2019 07:00  --------------------------------------------------------  IN: 0 mL / OUT: 450 mL / NET: -450 mL        Constitutional: NAD, awake and alert, well-developed  HEENT: PERR, EOMI,  No oral cyananosis.  Neck:  supple,  No JVD  Respiratory: Breath sounds are clear bilaterally, No wheezing, rales or rhonchi  Cardiovascular: S1 and S2, regular rate and rhythm, 1/6 EVA.  Gastrointestinal: Bowel Sounds present, soft, nontender.   Extremities: No peripheral edema. No clubbing or cyanosis.  Vascular: 2+ peripheral pulses  Neurological: A/O x 3, no focal deficits  Musculoskeletal: no calf tenderness.  Skin: No rashes.      LABS: All Labs Reviewed:                                     13.9   7.15  )-----------( 304      ( 17 Oct 2019 07:45 )             41.8     10-17    137  |  102  |  11  ----------------------------<  101<H>  4.4   |  29  |  0.60    Ca    9.0      17 Oct 2019 07:45  Mg     2.1     10-15    TPro  6.7  /  Alb  3.6  /  TBili  0.4  /  DBili  x   /  AST  15  /  ALT  22  /  AlkPhos  44  10-17    CARDIAC MARKERS ( 15 Oct 2019 18:57 )  .000 ng/mL / x     / x     / x     / x      CARDIAC MARKERS ( 15 Oct 2019 11:20 )  .000 ng/mL / x     / x     / x     / x          LIVER FUNCTIONS - ( 17 Oct 2019 07:45 )  Alb: 3.6 g/dL / Pro: 6.7 g/dL / ALK PHOS: 44 U/L / ALT: 22 U/L DA / AST: 15 U/L / GGT: x           PT/INR - ( 15 Oct 2019 11:20 )   PT: 11.4 sec;   INR: 1.04 ratio         PTT - ( 15 Oct 2019 11:20 )  PTT:31.9 sec      < from: US Transthoracic Echocardiogram w/Doppler Complete (10.16.19 @ 09:01) >  mpression: Grossly normal study as described above. Please note the   patient appear to be in sinus rhythm during this study.          < end of copied text >

## 2019-10-17 NOTE — PROGRESS NOTE ADULT - PROBLEM SELECTOR PLAN 3
cont lipitor
controlled of candesartan and on metoprolol.  Follows with Dr. Kemp as outpt. She will fu with him on dc.
controlled of candesartan and on metoprolol.  Follows with Dr. Kemp as outpt. She will fu with him on dc.

## 2019-10-17 NOTE — PROGRESS NOTE ADULT - SUBJECTIVE AND OBJECTIVE BOX
Neurology follow up note    PRUDENCIO [id number]      Interval History:    Patient feels ok no new complaints.    MEDICATIONS    amitriptyline 5 milliGRAM(s) Oral daily  amLODIPine   Tablet 2.5 milliGRAM(s) Oral daily  apixaban 5 milliGRAM(s) Oral every 12 hours  atorvastatin 10 milliGRAM(s) Oral at bedtime  docusate sodium 100 milliGRAM(s) Oral daily  famotidine    Tablet 20 milliGRAM(s) Oral two times a day  levothyroxine 75 MICROGram(s) Oral daily  metoprolol succinate ER 25 milliGRAM(s) Oral daily      Allergies    No Known Allergies    Intolerances    Avelox (Nausea)  Dynabac (Nausea)  Levaquin (Nausea)          Vital Signs Last 24 Hrs  T(C): 36.5 (17 Oct 2019 09:30), Max: 36.9 (16 Oct 2019 16:55)  T(F): 97.7 (17 Oct 2019 09:30), Max: 98.5 (16 Oct 2019 16:55)  HR: 68 (17 Oct 2019 11:20) (59 - 78)  BP: 156/66 (17 Oct 2019 11:20) (105/58 - 156/66)  BP(mean): --  RR: 18 (17 Oct 2019 09:30) (17 - 18)  SpO2: 97% (17 Oct 2019 09:30) (94% - 99%)      REVIEW OF SYSTEMS:  Constitutional:  No fever, chills, or night sweats.  Head:  Positive episodes of lightheadedness that current both lying and standing, but worse when standing.  Eyes:  Positive history of blurry vision in the right eye which is not new.  Ears:  No ringing in the ears.  Neck:  No neck pain.  Respiratory:  No shortness of breath.  Cardiovascular:  No chest pain.  Abdomen:  No nausea, vomiting, or abdominal pain.  Extremities/Neurological:  No numbness or tingling.  Musculoskeletal:  No joint pain.    PHYSICAL EXAMINATION:  HEENT:  Head:  Normocephalic, atraumatic.  Eyes:  No scleral icterus.  Ears:  Hearing bilaterally was intact.  NECK:  Supple.  RESPIRATORY:  Good air entry bilaterally.  CARDIOVASCULAR:  S1 and S2 heard.  ABDOMEN:  Soft and nontender.  EXTREMITIES:  No clubbing or cyanosis were noted.  NEUROLOGIC:  The patient is awake and alert.  Extraocular movements were intact.  Pupils were equal, round, and reactive bilaterally 3 mm to 2 mm.  Full visual fields.  Speech was fluent.  Smile was symmetric.  Motor:  Bilateral upper and lower were 5/5.  Sensory:  Bilateral upper and lower intact to light touch.  No drift.  Finger-to-nose within normal limits.            LABS:  CBC Full  -  ( 17 Oct 2019 07:45 )  WBC Count : 7.15 K/uL  RBC Count : 4.51 M/uL  Hemoglobin : 13.9 g/dL  Hematocrit : 41.8 %  Platelet Count - Automated : 304 K/uL  Mean Cell Volume : 92.7 fl  Mean Cell Hemoglobin : 30.8 pg  Mean Cell Hemoglobin Concentration : 33.3 gm/dL  Auto Neutrophil # : x  Auto Lymphocyte # : x  Auto Monocyte # : x  Auto Eosinophil # : x  Auto Basophil # : x  Auto Neutrophil % : x  Auto Lymphocyte % : x  Auto Monocyte % : x  Auto Eosinophil % : x  Auto Basophil % : x      10-17    137  |  102  |  11  ----------------------------<  101<H>  4.4   |  29  |  0.60    Ca    9.0      17 Oct 2019 07:45    TPro  6.7  /  Alb  3.6  /  TBili  0.4  /  DBili  x   /  AST  15  /  ALT  22  /  AlkPhos  44  10-17    Hemoglobin A1C:     LIVER FUNCTIONS - ( 17 Oct 2019 07:45 )  Alb: 3.6 g/dL / Pro: 6.7 g/dL / ALK PHOS: 44 U/L / ALT: 22 U/L DA / AST: 15 U/L / GGT: x           Vitamin B12         RADIOLOGY    ANALYSIS AND PLAN:  This is an 85-year-old with episodes of dizziness.  1.	For episode of dizziness and lightheadedness, suspect this could be secondary to underlying cardiac issues.  The patient is now on atrial fibrillation.  There is no clear sign on examination to suggest a new cerebrovascular accident has ensued and no history to suggest underlying epilepsy.  2.	Telemetry evaluation.  3.	Check orthostatics as needed.  4.	Monitor systolic blood pressure.  5.	For history of hypothyroidism, continue the patient on Synthroid.  6.	For high cholesterol, continue the patient on her statin.  7.	 atrial fibrillation, anticoagulation as per Cardiology.  8.	Fall precaution.  9.	Physical Therapy.  10.	no new events   11.	Greater than 45 minutes of time was spent with the patient, plan of care, reviewing data, speaking to the multidisciplinary healthcare team.    Thank you for the courtesy of this consultation.

## 2019-10-22 ENCOUNTER — APPOINTMENT (OUTPATIENT)
Dept: CARDIOLOGY | Facility: CLINIC | Age: 84
End: 2019-10-22
Payer: MEDICARE

## 2019-10-22 ENCOUNTER — NON-APPOINTMENT (OUTPATIENT)
Age: 84
End: 2019-10-22

## 2019-10-22 VITALS
WEIGHT: 144 LBS | SYSTOLIC BLOOD PRESSURE: 176 MMHG | HEIGHT: 66 IN | OXYGEN SATURATION: 96 % | HEART RATE: 70 BPM | BODY MASS INDEX: 23.14 KG/M2 | DIASTOLIC BLOOD PRESSURE: 81 MMHG

## 2019-10-22 DIAGNOSIS — I48.0 PAROXYSMAL ATRIAL FIBRILLATION: ICD-10-CM

## 2019-10-22 PROCEDURE — 93000 ELECTROCARDIOGRAM COMPLETE: CPT

## 2019-10-22 PROCEDURE — 99215 OFFICE O/P EST HI 40 MIN: CPT

## 2019-10-22 NOTE — PHYSICAL EXAM
[Well Groomed] : well groomed [General Appearance - Well Developed] : well developed [Normal Appearance] : normal appearance [No Deformities] : no deformities [General Appearance - Well Nourished] : well nourished [Normal Conjunctiva] : the conjunctiva exhibited no abnormalities [Eyelids - No Xanthelasma] : the eyelids demonstrated no xanthelasmas [General Appearance - In No Acute Distress] : no acute distress [Normal Oral Mucosa] : normal oral mucosa [No Oral Pallor] : no oral pallor [Normal Jugular Venous A Waves Present] : normal jugular venous A waves present [No Oral Cyanosis] : no oral cyanosis [Normal Jugular Venous V Waves Present] : normal jugular venous V waves present [No Jugular Venous Mccrary A Waves] : no jugular venous mccrary A waves [Exaggerated Use Of Accessory Muscles For Inspiration] : no accessory muscle use [Respiration, Rhythm And Depth] : normal respiratory rhythm and effort [Auscultation Breath Sounds / Voice Sounds] : lungs were clear to auscultation bilaterally [Normal S1] : normal S1 [Normal Rate] : normal [S3] : no S3 [Normal S2] : normal S2 [S4] : no S4 [No Murmur] : no murmurs heard [Right Carotid Bruit] : no bruit heard over the right carotid [Left Carotid Bruit] : no bruit heard over the left carotid [Right Femoral Bruit] : no bruit heard over the right femoral artery [Left Femoral Bruit] : no bruit heard over the left femoral artery [2+] : left 2+ [No Pitting Edema] : no pitting edema present [No Abnormalities] : the abdominal aorta was not enlarged and no bruit was heard [Abdomen Tenderness] : non-tender [Abdomen Soft] : soft [Abdomen Mass (___ Cm)] : no abdominal mass palpated [Nail Clubbing] : no clubbing of the fingernails [Gait - Sufficient For Exercise Testing] : the gait was sufficient for exercise testing [Abnormal Walk] : normal gait [Cyanosis, Localized] : no localized cyanosis [Petechial Hemorrhages (___cm)] : no petechial hemorrhages [Skin Color & Pigmentation] : normal skin color and pigmentation [Oriented To Time, Place, And Person] : oriented to person, place, and time [] : no rash [Affect] : the affect was normal [No Anxiety] : not feeling anxious [Mood] : the mood was normal

## 2019-10-22 NOTE — HISTORY OF PRESENT ILLNESS
[FreeTextEntry1] : Elinor Kern presented to the office today for a followup cardiovascular evaluation.  She was last seen in the office 2 weeks ago, though she was recently hospitalized at [facility] [place] for atrial fibrillation.\par \par She is now 85 years old, with a history of hypertension with a significant reactive component. She has a history of high cholesterol, for which she has been taking atorvastatin. She has a history of temporal headaches, which she has previously related to emotional stress. This has been evaluated comprehensively in the past.\par \par When she was last in the office, she reported lightheadedness. She did not describe as well, but believed that she experiences lightheadedness when she stood on an intermittent basis. I felt that her blood pressure might be a bit low at times.  I reduced her candesartan, noting that I had reduced her amlodipine before that.  She was supposed to return to determine next steps.\par \par Unfortunately, she presented to the hospital having been experiencing a sense of lightheadedness, and general malaise. She specifically denies any sense of tachycardia or palpitations. Back to sinus rhythm. She was given metoprolol, though the dose was reduced because of the degree of bradycardia. She was anticoagulated but Eliquis. She was discharged.\par \par She presents to the office today extremely concerned. Her blood pressure has been rising. She is concerned that my reduction in the dose of candesartan recently resulted in her atrial fibrillation. She believes that her blood pressure is too high, and is concerned about a stroke.

## 2019-10-22 NOTE — DISCUSSION/SUMMARY
[FreeTextEntry1] : It remains unclear to what degree she has essential hypertension and to what degree she has reactive hypertension. I suspect that overall, she has suboptimally controlled essential hypertension, but to what degree remains unclear.\par \par She will resume candesartan 16 mg daily.  She will increase metoprolol to 50. She will call me next week, and let me know how she feels and what her blood pressure is. A 24 hour BP monitor may be worthwhile at that time. She will schedule a 30 day event monitor.  She will see me next month, or earlier if needed.

## 2019-10-22 NOTE — CARDIOLOGY SUMMARY
[No Exercise Ind Arr] : no exercise induced arrhythmias [No Ischemia] : no Ischemia [___] : [unfilled] [No Symptoms] : no Symptoms [None] : normal LV function [LVEF ___%] : LVEF [unfilled]% [Normal] : normal LA size [Mild] : mild mitral regurgitation

## 2019-10-29 ENCOUNTER — MEDICATION RENEWAL (OUTPATIENT)
Age: 84
End: 2019-10-29

## 2019-10-29 ENCOUNTER — APPOINTMENT (OUTPATIENT)
Dept: CARDIOLOGY | Facility: CLINIC | Age: 84
End: 2019-10-29
Payer: MEDICARE

## 2019-10-29 PROCEDURE — 93268 ECG RECORD/REVIEW: CPT

## 2019-11-11 ENCOUNTER — OTHER (OUTPATIENT)
Age: 84
End: 2019-11-11

## 2019-11-14 ENCOUNTER — MED ADMIN CHARGE (OUTPATIENT)
Age: 84
End: 2019-11-14

## 2019-11-25 ENCOUNTER — APPOINTMENT (OUTPATIENT)
Dept: CARDIOLOGY | Facility: CLINIC | Age: 84
End: 2019-11-25
Payer: MEDICARE

## 2019-11-25 PROCEDURE — 93790 AMBL BP MNTR W/SW I&R: CPT

## 2019-12-03 ENCOUNTER — APPOINTMENT (OUTPATIENT)
Dept: CARDIOLOGY | Facility: CLINIC | Age: 84
End: 2019-12-03
Payer: MEDICARE

## 2019-12-03 VITALS
SYSTOLIC BLOOD PRESSURE: 151 MMHG | HEART RATE: 74 BPM | DIASTOLIC BLOOD PRESSURE: 64 MMHG | BODY MASS INDEX: 23.3 KG/M2 | OXYGEN SATURATION: 96 % | WEIGHT: 145 LBS | HEIGHT: 66 IN

## 2019-12-03 PROCEDURE — 99214 OFFICE O/P EST MOD 30 MIN: CPT

## 2019-12-03 PROCEDURE — 93000 ELECTROCARDIOGRAM COMPLETE: CPT

## 2019-12-03 NOTE — PHYSICAL EXAM
[General Appearance - Well Developed] : well developed [Normal Appearance] : normal appearance [General Appearance - Well Nourished] : well nourished [Well Groomed] : well groomed [No Deformities] : no deformities [General Appearance - In No Acute Distress] : no acute distress [Normal Conjunctiva] : the conjunctiva exhibited no abnormalities [Eyelids - No Xanthelasma] : the eyelids demonstrated no xanthelasmas [Normal Oral Mucosa] : normal oral mucosa [No Oral Pallor] : no oral pallor [Normal Jugular Venous A Waves Present] : normal jugular venous A waves present [No Oral Cyanosis] : no oral cyanosis [No Jugular Venous Mccrary A Waves] : no jugular venous mccrary A waves [Normal Jugular Venous V Waves Present] : normal jugular venous V waves present [Respiration, Rhythm And Depth] : normal respiratory rhythm and effort [Exaggerated Use Of Accessory Muscles For Inspiration] : no accessory muscle use [Auscultation Breath Sounds / Voice Sounds] : lungs were clear to auscultation bilaterally [Abdomen Tenderness] : non-tender [Abdomen Soft] : soft [Abnormal Walk] : normal gait [Abdomen Mass (___ Cm)] : no abdominal mass palpated [Nail Clubbing] : no clubbing of the fingernails [Gait - Sufficient For Exercise Testing] : the gait was sufficient for exercise testing [Cyanosis, Localized] : no localized cyanosis [Petechial Hemorrhages (___cm)] : no petechial hemorrhages [Skin Color & Pigmentation] : normal skin color and pigmentation [] : no rash [Affect] : the affect was normal [Oriented To Time, Place, And Person] : oriented to person, place, and time [Mood] : the mood was normal [No Anxiety] : not feeling anxious [Normal Rate] : normal [Normal S1] : normal S1 [Normal S2] : normal S2 [S4] : no S4 [S3] : no S3 [No Murmur] : no murmurs heard [Right Carotid Bruit] : no bruit heard over the right carotid [Left Carotid Bruit] : no bruit heard over the left carotid [Right Femoral Bruit] : no bruit heard over the right femoral artery [Left Femoral Bruit] : no bruit heard over the left femoral artery [No Pitting Edema] : no pitting edema present [2+] : left 2+ [No Abnormalities] : the abdominal aorta was not enlarged and no bruit was heard

## 2019-12-03 NOTE — HISTORY OF PRESENT ILLNESS
[FreeTextEntry1] : Elinor Kern presented to the office today for a followup cardiovascular evaluation.  She was last seen in the office 6 weeks ago.\par \par She is now 85 years old, with a history of hypertension with a significant reactive component. She has a history of high cholesterol, for which she has been taking atorvastatin. She has a history of temporal headaches, which she has previously related to emotional stress. This has been evaluated comprehensively in the past. More recently she has had PAF, and is anticoagulated.\par \par When she was last in the office, she had been recently admitted with paroxysmal atrial fibrillation. Her blood pressure was rising. She continued to experience lightheadedness, without clear explanation. I was fairly convinced that her blood pressure numbers reflect and reactive hypertension, and not uncontrolled essential hypertension. She went on to have ambulatory 24 hour blood pressure monitoring, and a 30 day event monitor. Her event monitor recorded sinus rhythm with every episode of lightheadedness. Ambulatory blood pressure monitoring revealed controlled pressures overall, with episodes of mildly elevated pressures, as well as low blood pressures.\par \par She feels reasonably well today. She does not report any new symptoms. She has had no bleeding. She has had no symptoms suggestive of symptomatic atrial fibrillation. She no longer checks her blood pressure at home, as per my request.

## 2019-12-03 NOTE — DISCUSSION/SUMMARY
[FreeTextEntry1] : At this point, I do not think she requires any additional testing. I think we have settled that she has mostly controlled essential hypertension, with superimposed reactive hypertension. She is asymptomatic paroxysmal atrial fibrillation.  Anticoagulation should be continued.\par \par I have suggested a followup in 3 months.

## 2020-04-21 ENCOUNTER — RX RENEWAL (OUTPATIENT)
Age: 85
End: 2020-04-21

## 2020-05-11 ENCOUNTER — RX RENEWAL (OUTPATIENT)
Age: 85
End: 2020-05-11

## 2020-06-15 ENCOUNTER — NON-APPOINTMENT (OUTPATIENT)
Age: 85
End: 2020-06-15

## 2020-06-15 ENCOUNTER — APPOINTMENT (OUTPATIENT)
Dept: CARDIOLOGY | Facility: CLINIC | Age: 85
End: 2020-06-15
Payer: MEDICARE

## 2020-06-15 VITALS
OXYGEN SATURATION: 98 % | DIASTOLIC BLOOD PRESSURE: 74 MMHG | BODY MASS INDEX: 23.78 KG/M2 | HEIGHT: 66 IN | HEART RATE: 81 BPM | WEIGHT: 148 LBS | SYSTOLIC BLOOD PRESSURE: 186 MMHG

## 2020-06-15 PROCEDURE — 93000 ELECTROCARDIOGRAM COMPLETE: CPT

## 2020-06-15 PROCEDURE — 99214 OFFICE O/P EST MOD 30 MIN: CPT

## 2020-06-15 NOTE — DISCUSSION/SUMMARY
[FreeTextEntry1] : She has not had any clinically evident episodes of atrial fibrillation. She should continue anticoagulation.\par \par In anticipation of her glaucoma surgery, she may hold Eliquis for 3 days prior to the procedure. It should be resumed following that.  Although her preop recommendations from the ophthalmology office suggest that she should be off for 7 days, I think that Eliquis need not be held for that long, and that the risk of that would exceed the benefit.\par \par Her blood pressure is elevated today, as usual. We have established that her blood pressure elevation here is reactive hypertension, and not essential hypertension. She does not need additional medication for this, at this time.\par \par She is otherwise optimized for her planned procedure. Routine hemodynamic monitoring is recommended.\par

## 2020-06-15 NOTE — PHYSICAL EXAM
[General Appearance - Well Developed] : well developed [Normal Appearance] : normal appearance [Well Groomed] : well groomed [General Appearance - Well Nourished] : well nourished [No Deformities] : no deformities [General Appearance - In No Acute Distress] : no acute distress [Normal Conjunctiva] : the conjunctiva exhibited no abnormalities [Eyelids - No Xanthelasma] : the eyelids demonstrated no xanthelasmas [Normal Oral Mucosa] : normal oral mucosa [No Oral Pallor] : no oral pallor [No Oral Cyanosis] : no oral cyanosis [Normal Jugular Venous A Waves Present] : normal jugular venous A waves present [Normal Jugular Venous V Waves Present] : normal jugular venous V waves present [No Jugular Venous Mccrary A Waves] : no jugular venous mccrary A waves [Respiration, Rhythm And Depth] : normal respiratory rhythm and effort [Exaggerated Use Of Accessory Muscles For Inspiration] : no accessory muscle use [Auscultation Breath Sounds / Voice Sounds] : lungs were clear to auscultation bilaterally [Abdomen Tenderness] : non-tender [Abdomen Soft] : soft [Abdomen Mass (___ Cm)] : no abdominal mass palpated [Gait - Sufficient For Exercise Testing] : the gait was sufficient for exercise testing [Abnormal Walk] : normal gait [Nail Clubbing] : no clubbing of the fingernails [Cyanosis, Localized] : no localized cyanosis [Petechial Hemorrhages (___cm)] : no petechial hemorrhages [Skin Color & Pigmentation] : normal skin color and pigmentation [] : no rash [Oriented To Time, Place, And Person] : oriented to person, place, and time [Affect] : the affect was normal [No Anxiety] : not feeling anxious [Mood] : the mood was normal [Normal Rate] : normal [Normal S1] : normal S1 [Normal S2] : normal S2 [No Murmur] : no murmurs heard [2+] : left 2+ [No Abnormalities] : the abdominal aorta was not enlarged and no bruit was heard [No Pitting Edema] : no pitting edema present [S3] : no S3 [S4] : no S4 [Right Carotid Bruit] : no bruit heard over the right carotid [Left Carotid Bruit] : no bruit heard over the left carotid [Right Femoral Bruit] : no bruit heard over the right femoral artery [Left Femoral Bruit] : no bruit heard over the left femoral artery

## 2020-06-15 NOTE — HISTORY OF PRESENT ILLNESS
[FreeTextEntry1] : Elinor Kern presented to the office today for a followup cardiovascular evaluation.  She was last seen in the office in December.\par \par She is now 86 years old, with a history of hypertension with a significant reactive component. She has a history of high cholesterol, for which she has been taking atorvastatin. She has a history of temporal headaches, which she has previously related to emotional stress. This has been evaluated comprehensively in the past. More recently she has had asymptomatic PAF, and is anticoagulated.\par \par  She continues to feel well from a cardiovascular perspective. She does not report chest discomfort or shortness of breath with activity. She denies orthopnea, PND and lower extremity edema. She denies palpitations. She has never had any clear symptoms of atrial fibrillation, other than feeling generally unwell at that time. She denies dizziness and syncope. She does not check her blood pressure, at my request, because it makes her anxious.\par \par She is planned for glaucoma surgery.

## 2020-06-15 NOTE — CARDIOLOGY SUMMARY
[No Ischemia] : no Ischemia [No Exercise Ind Arr] : no exercise induced arrhythmias [No Symptoms] : no Symptoms [LVEF ___%] : LVEF [unfilled]% [None] : normal LV function [Normal] : normal LA size [Mild] : mild mitral regurgitation [___] : [unfilled]

## 2020-07-01 NOTE — PATIENT PROFILE ADULT - NSPROPASSIVESMOKEEXPOSURE_GEN_A_NUR
Subjective:     Jean Lott is a 63 y.o. male who is here for follow up for right sided nosebleeds. He states he went 2 weeks without nose bleeding after nasal cautery on 5/15/20, but is now having recurrent nose bleeding again. He has been using nasal saline and Vaseline with limited benefit.     HPI (5/15/20):  Mr. Lott reports having nose bleeds for the past several years that usually occur after blowing his nose. He states his nose bleeds usually would occur in his left nostril but are now only occurring in his right nostril. He has not previously had nasal cauterization.  The bleeding has not required packing for control. The last episode was 1 days ago, and is not currently active.  There is not a prior history of nasal surgery.  There is not a prior history of nasal trauma.  There is not a history of environmental allergies.  He does currently use a nasal spray, Afrin spray as needed (once per month). He has also tried neosporin with some benefit.  There is not a family history to suggest a clotting disorder.  He does not currently take a blood-thinning agent.       Past Medical History  He has a past medical history of Abnormal stress test, Cardiomyopathy, Diabetes mellitus, Diabetes mellitus, type 2, Diffuse large B cell lymphoma, Dyslipidemia, Dyspnea, GERD (gastroesophageal reflux disease), Hypertension, Lymphoma, Neuropathy, and Sleep apnea.    Past Surgical History  He has a past surgical history that includes arm surgery; Lymph node dissection (Right); Trigger finger release (Left, 8/31/2018); and Left heart catheterization (N/A, 11/30/2018).    Family History  His family history includes Breast cancer in his mother; Colon cancer in his maternal aunt, maternal uncle, and mother; Heart disease in his father; Lupus in his sister.    Social History  He reports that he quit smoking about 20 years ago. He has never used smokeless tobacco. He reports that he does not drink alcohol or use  drugs.    Allergies  He has No Known Allergies.    Medications  He has a current medication list which includes the following prescription(s): amlodipine-benazepril 5-20 mg, ascorbic acid (vitamin c), aspirin, bd ultra-fine ishmael pen needle, diclofenac, ezetimibe, gabapentin, jardiance, metformin, onetouch ultra test, ozempic, pantoprazole, rosuvastatin, tizanidine, toujeo solostar u-300 insulin, tramadol, and carvedilol, and the following Facility-Administered Medications: sodium bicarbonate 1 mEq/mL (8.4 %) 150 mEq in sodium chloride 0.45% 1,000 mL infusion.    Review of Systems   Constitutional: Negative for chills, fatigue and fever.   HENT: Positive for hearing loss and nosebleeds. Negative for congestion, facial swelling, postnasal drip, rhinorrhea, sinus pressure, sinus pain, sneezing, sore throat and tinnitus.    Eyes: Negative for photophobia, redness, itching and visual disturbance.   Respiratory: Negative for apnea, cough, shortness of breath, wheezing and stridor.    Cardiovascular: Negative for chest pain and palpitations.   Gastrointestinal: Negative for diarrhea, nausea and vomiting.   Endocrine: Negative.    Genitourinary: Negative for decreased urine volume, dysuria and frequency.   Musculoskeletal: Negative for arthralgias, myalgias and neck stiffness.   Skin: Negative for rash and wound.   Allergic/Immunologic: Negative for environmental allergies, food allergies and immunocompromised state.   Neurological: Negative for dizziness, syncope, weakness, light-headedness and headaches.   Hematological: Negative for adenopathy. Does not bruise/bleed easily.   Psychiatric/Behavioral: Negative for confusion, decreased concentration and sleep disturbance.          Objective:     There were no vitals taken for this visit.     Constitutional:   He is oriented to person, place, and time. Vital signs are normal. He appears well-developed and well-nourished. He appears alert. Normal speech.       Head:  Normocephalic and atraumatic.     Ears:    Right Ear: No lacerations. No drainage, swelling or tenderness. No foreign bodies. No mastoid tenderness. Tympanic membrane is not injected, not scarred, not perforated, not erythematous, not retracted and not bulging. Tympanic membrane mobility is normal. No middle ear effusion. No hemotympanum.   Left Ear: No lacerations. No drainage, swelling or tenderness. No foreign bodies. No mastoid tenderness. Tympanic membrane is not injected, not scarred, not perforated, not erythematous, not retracted and not bulging. Tympanic membrane mobility is normal.  No middle ear effusion. No hemotympanum.     Nose:  No mucosal edema, rhinorrhea, nose lacerations, sinus tenderness, septal deviation, nasal septal hematoma or polyps. Epistaxis is observed.  No foreign bodies. No turbinate hypertrophy.  Right sinus exhibits no maxillary sinus tenderness and no frontal sinus tenderness. Left sinus exhibits no maxillary sinus tenderness and no frontal sinus tenderness.         Mouth/Throat  Oropharynx clear and moist without lesions or asymmetry, normal uvula midline and lips, teeth, and gums normal. No uvula swelling, oral lesions, trismus, mucous membrane lesions or xerostomia. No oropharyngeal exudate or posterior oropharyngeal erythema.     Neck:  Neck normal without thyromegaly masses, asymmetry, normal tracheal structure, crepitus, and tenderness and no adenopathy.     Psychiatric:   He has a normal mood and affect. His speech is normal and behavior is normal.     Neurological:   He is alert and oriented to person, place, and time. No cranial nerve deficit.     Skin:   No abrasions, lacerations, lesions, or rashes.       Procedure    Control of Epistaxis    Indication:  Epistaxis    Informed consent was obtained prior to proceeding.  The right nasal cavity was anesthetized with topical 4% lidocaine.  Bleeding was localized to the right nasal cavity in Little's area and  cauterized with silver nitrate.     The patient tolerated the procedure well.        Data Reviewed    Hemoglobin (g/dL)   Date Value   05/01/2020 12.9 (L)   05/01/2020 13.0 (L)     Hematocrit (%)   Date Value   05/01/2020 38.2 (L)   05/01/2020 38.3 (L)     Platelets (K/uL)   Date Value   05/01/2020 198   05/01/2020 197     Prothrombin Time (sec)   Date Value   11/28/2018 9.9     aPTT (sec)   Date Value   11/28/2018 24.1 (L)     INR (no units)   Date Value   11/28/2018 1.0            Assessment:     1. Recurrent epistaxis         Plan:     I had a long discussion with the patient regarding his condition and the further workup and management options.    Nasal cautery performed to anterior septum in right nostril with silver nitrate.  I advised him to refrain from nose blowing.  I recommendto nasal saline spray and Vaseline to combat nasal mucosal dryness.  Avoid using fan in bedroom as this may contribute to nasal dryness.  Consider humidifier for CPAP.     He will follow up with Dr. Obregon if symptoms do not improve, may benefit from electrocautery.   No

## 2020-07-08 ENCOUNTER — TRANSCRIPTION ENCOUNTER (OUTPATIENT)
Age: 85
End: 2020-07-08

## 2020-07-15 ENCOUNTER — RX RENEWAL (OUTPATIENT)
Age: 85
End: 2020-07-15

## 2020-10-28 NOTE — PATIENT PROFILE ADULT - NSPROMUTPARTICIPCAREFT_GEN_A_NUR
Patient has not had metoprolol or xarelto since 10/23/2020 at 1800. Dr. Zandra Aldnaa aware. No orders received n/a

## 2020-11-05 ENCOUNTER — RX RENEWAL (OUTPATIENT)
Age: 85
End: 2020-11-05

## 2020-11-13 ENCOUNTER — RX RENEWAL (OUTPATIENT)
Age: 85
End: 2020-11-13

## 2021-01-21 ENCOUNTER — APPOINTMENT (OUTPATIENT)
Dept: CARDIOLOGY | Facility: CLINIC | Age: 86
End: 2021-01-21
Payer: MEDICARE

## 2021-01-21 ENCOUNTER — NON-APPOINTMENT (OUTPATIENT)
Age: 86
End: 2021-01-21

## 2021-01-21 VITALS
HEIGHT: 66 IN | HEART RATE: 72 BPM | BODY MASS INDEX: 23.63 KG/M2 | WEIGHT: 147 LBS | DIASTOLIC BLOOD PRESSURE: 76 MMHG | OXYGEN SATURATION: 96 % | SYSTOLIC BLOOD PRESSURE: 191 MMHG

## 2021-01-21 PROCEDURE — 93000 ELECTROCARDIOGRAM COMPLETE: CPT

## 2021-01-21 PROCEDURE — 99214 OFFICE O/P EST MOD 30 MIN: CPT

## 2021-01-21 NOTE — DISCUSSION/SUMMARY
[FreeTextEntry1] : She has not had any clinically evident episodes of atrial fibrillation. She should continue anticoagulation.\par \par In anticipation of her glaucoma surgery, she may hold Eliquis for 3 days prior to the procedure. It should be resumed following that.   \par \par Her blood pressure is elevated today, as usual. We have established that her blood pressure elevation here is reactive hypertension, and not essential hypertension. She does not need additional medication for this, at this time.\par \par She is otherwise optimized for her planned procedure. Routine hemodynamic monitoring is recommended.\par

## 2021-01-21 NOTE — PHYSICAL EXAM
[General Appearance - Well Developed] : well developed [Normal Appearance] : normal appearance [Well Groomed] : well groomed [General Appearance - Well Nourished] : well nourished [No Deformities] : no deformities [General Appearance - In No Acute Distress] : no acute distress [Normal Conjunctiva] : the conjunctiva exhibited no abnormalities [Eyelids - No Xanthelasma] : the eyelids demonstrated no xanthelasmas [Normal Oral Mucosa] : normal oral mucosa [No Oral Pallor] : no oral pallor [No Oral Cyanosis] : no oral cyanosis [Normal Jugular Venous A Waves Present] : normal jugular venous A waves present [Normal Jugular Venous V Waves Present] : normal jugular venous V waves present [No Jugular Venous Mccrary A Waves] : no jugular venous mccrary A waves [Respiration, Rhythm And Depth] : normal respiratory rhythm and effort [Exaggerated Use Of Accessory Muscles For Inspiration] : no accessory muscle use [Auscultation Breath Sounds / Voice Sounds] : lungs were clear to auscultation bilaterally [Abdomen Soft] : soft [Abdomen Tenderness] : non-tender [Abdomen Mass (___ Cm)] : no abdominal mass palpated [Abnormal Walk] : normal gait [Gait - Sufficient For Exercise Testing] : the gait was sufficient for exercise testing [Cyanosis, Localized] : no localized cyanosis [Nail Clubbing] : no clubbing of the fingernails [Petechial Hemorrhages (___cm)] : no petechial hemorrhages [Skin Color & Pigmentation] : normal skin color and pigmentation [] : no rash [Oriented To Time, Place, And Person] : oriented to person, place, and time [Affect] : the affect was normal [Mood] : the mood was normal [No Anxiety] : not feeling anxious [Normal Rate] : normal [Normal S1] : normal S1 [Normal S2] : normal S2 [No Murmur] : no murmurs heard [2+] : left 2+ [No Abnormalities] : the abdominal aorta was not enlarged and no bruit was heard [No Pitting Edema] : no pitting edema present [S3] : no S3 [S4] : no S4 [Right Carotid Bruit] : no bruit heard over the right carotid [Left Carotid Bruit] : no bruit heard over the left carotid [Right Femoral Bruit] : no bruit heard over the right femoral artery [Left Femoral Bruit] : no bruit heard over the left femoral artery

## 2021-01-21 NOTE — HISTORY OF PRESENT ILLNESS
[FreeTextEntry1] : Elinor Kern presented to the office today for a followup cardiovascular evaluation.  She was last seen in the office in June.\par \par She is now 86 years old, with a history of hypertension with a significant reactive component. She has a history of high cholesterol, for which she has been taking atorvastatin. She has a history of temporal headaches, which she has previously related to emotional stress. This has been evaluated comprehensively in the past. More recently she has had asymptomatic PAF, and is anticoagulated.\par \par  She continues to feel well from a cardiovascular perspective. She does not report chest discomfort or shortness of breath with activity. She denies orthopnea, PND and lower extremity edema. She denies palpitations. She has never had any clear symptoms of atrial fibrillation, other than feeling generally unwell at that time. She denies dizziness and syncope. She does not check her blood pressure, at my request, because it makes her anxious.\par \par She is planned for glaucoma surgery.  She had a similar procedure in June, which was unfortunately not fully successful.

## 2021-03-10 ENCOUNTER — TRANSCRIPTION ENCOUNTER (OUTPATIENT)
Age: 86
End: 2021-03-10

## 2021-04-19 ENCOUNTER — RX RENEWAL (OUTPATIENT)
Age: 86
End: 2021-04-19

## 2021-06-14 ENCOUNTER — NON-APPOINTMENT (OUTPATIENT)
Age: 86
End: 2021-06-14

## 2021-06-14 ENCOUNTER — APPOINTMENT (OUTPATIENT)
Dept: CARDIOLOGY | Facility: CLINIC | Age: 86
End: 2021-06-14
Payer: MEDICARE

## 2021-06-14 VITALS
BODY MASS INDEX: 23.46 KG/M2 | OXYGEN SATURATION: 99 % | HEIGHT: 66 IN | SYSTOLIC BLOOD PRESSURE: 163 MMHG | HEART RATE: 64 BPM | DIASTOLIC BLOOD PRESSURE: 78 MMHG | WEIGHT: 146 LBS

## 2021-06-14 PROCEDURE — 93000 ELECTROCARDIOGRAM COMPLETE: CPT

## 2021-06-14 PROCEDURE — 99214 OFFICE O/P EST MOD 30 MIN: CPT

## 2021-06-14 NOTE — DISCUSSION/SUMMARY
[FreeTextEntry1] : She has not had any clinically evident episodes of atrial fibrillation. She should continue anticoagulation.\par \par \par Her blood pressure is elevated today, as usual. We have established that her blood pressure elevation here is reactive hypertension, and not essential hypertension. She does not need additional medication for this, at this time.  In fact, noting her dizziness, I am concerned that perhaps she is overmedicated.  I cannot ask her to check her blood pressure at home.  I have suggested that we try to discontinue amlodipine, and perform another ambulatory blood pressure monitor.  We discussed the possibility of carotid disease.  She had only mild disease 3 years ago, and this will be repeated.\par \par She has a mildly low sodium of 133.  I doubt this is a contributor.\par \par

## 2021-06-14 NOTE — HISTORY OF PRESENT ILLNESS
[FreeTextEntry1] : Elinor Kern presented to the office today for a followup cardiovascular evaluation.  She was last seen in the office in January.\par \par She is now 87 years old, with a history of hypertension with a significant reactive component. She has a history of high cholesterol, for which she has been taking atorvastatin. She has a history of temporal headaches, which she has previously related to emotional stress. This has been evaluated comprehensively in the past. More recently she has had asymptomatic PAF, and is anticoagulated.\par \par She does not report chest discomfort or shortness of breath with activity. She denies orthopnea, PND and lower extremity edema. She denies palpitations. She has never had any clear symptoms of atrial fibrillation, other than feeling generally unwell at that time. She denies syncope. \par \par Her primary issue is dizziness.  She says that she gets lightheaded when she stands up.  This is not a 100% reproducible phenomenon, but it happens with some frequency.  She wonders if she is overmedicated, and is intermittently hypertensive.

## 2021-06-14 NOTE — PHYSICAL EXAM
[General Appearance - Well Developed] : well developed [Normal Appearance] : normal appearance [Well Groomed] : well groomed [General Appearance - Well Nourished] : well nourished [No Deformities] : no deformities [General Appearance - In No Acute Distress] : no acute distress [Normal Conjunctiva] : the conjunctiva exhibited no abnormalities [Eyelids - No Xanthelasma] : the eyelids demonstrated no xanthelasmas [Normal Oral Mucosa] : normal oral mucosa [No Oral Pallor] : no oral pallor [No Oral Cyanosis] : no oral cyanosis [Normal Jugular Venous A Waves Present] : normal jugular venous A waves present [Normal Jugular Venous V Waves Present] : normal jugular venous V waves present [No Jugular Venous Mccrary A Waves] : no jugular venous mccrary A waves [Respiration, Rhythm And Depth] : normal respiratory rhythm and effort [Exaggerated Use Of Accessory Muscles For Inspiration] : no accessory muscle use [Auscultation Breath Sounds / Voice Sounds] : lungs were clear to auscultation bilaterally [Abdomen Soft] : soft [Abdomen Tenderness] : non-tender [Abdomen Mass (___ Cm)] : no abdominal mass palpated [Abnormal Walk] : normal gait [Gait - Sufficient For Exercise Testing] : the gait was sufficient for exercise testing [Nail Clubbing] : no clubbing of the fingernails [Cyanosis, Localized] : no localized cyanosis [Petechial Hemorrhages (___cm)] : no petechial hemorrhages [Skin Color & Pigmentation] : normal skin color and pigmentation [] : no rash [Oriented To Time, Place, And Person] : oriented to person, place, and time [Affect] : the affect was normal [Mood] : the mood was normal [No Anxiety] : not feeling anxious [Normal Rate] : normal [Normal S1] : normal S1 [Normal S2] : normal S2 [S3] : no S3 [S4] : no S4 [No Murmur] : no murmurs heard [Right Carotid Bruit] : no bruit heard over the right carotid [Left Carotid Bruit] : no bruit heard over the left carotid [Right Femoral Bruit] : no bruit heard over the right femoral artery [Left Femoral Bruit] : no bruit heard over the left femoral artery [2+] : left 2+ [No Pitting Edema] : no pitting edema present

## 2021-06-14 NOTE — CARDIOLOGY SUMMARY
[___] : [unfilled] [No Exercise Ind Arr] : no exercise induced arrhythmias [No Ischemia] : no Ischemia [No Symptoms] : no Symptoms [LVEF ___%] : LVEF [unfilled]% [None] : normal LV function [Normal] : normal LA size [Mild] : mild mitral regurgitation

## 2021-06-23 ENCOUNTER — APPOINTMENT (OUTPATIENT)
Dept: CARDIOLOGY | Facility: CLINIC | Age: 86
End: 2021-06-23
Payer: MEDICARE

## 2021-06-23 PROCEDURE — 93790 AMBL BP MNTR W/SW I&R: CPT

## 2021-06-24 ENCOUNTER — APPOINTMENT (OUTPATIENT)
Dept: CARDIOLOGY | Facility: CLINIC | Age: 86
End: 2021-06-24
Payer: MEDICARE

## 2021-06-24 PROCEDURE — 93880 EXTRACRANIAL BILAT STUDY: CPT

## 2021-07-29 ENCOUNTER — APPOINTMENT (OUTPATIENT)
Dept: CARDIOLOGY | Facility: CLINIC | Age: 86
End: 2021-07-29
Payer: MEDICARE

## 2021-07-29 ENCOUNTER — NON-APPOINTMENT (OUTPATIENT)
Age: 86
End: 2021-07-29

## 2021-07-29 VITALS
HEIGHT: 66 IN | HEART RATE: 65 BPM | SYSTOLIC BLOOD PRESSURE: 150 MMHG | OXYGEN SATURATION: 97 % | DIASTOLIC BLOOD PRESSURE: 71 MMHG | WEIGHT: 144 LBS | BODY MASS INDEX: 23.14 KG/M2

## 2021-07-29 PROCEDURE — 93000 ELECTROCARDIOGRAM COMPLETE: CPT

## 2021-07-29 PROCEDURE — 99214 OFFICE O/P EST MOD 30 MIN: CPT

## 2021-07-29 NOTE — PHYSICAL EXAM
[General Appearance - Well Developed] : well developed [Normal Appearance] : normal appearance [Well Groomed] : well groomed [General Appearance - Well Nourished] : well nourished [No Deformities] : no deformities [General Appearance - In No Acute Distress] : no acute distress [Normal Conjunctiva] : the conjunctiva exhibited no abnormalities [Eyelids - No Xanthelasma] : the eyelids demonstrated no xanthelasmas [Normal Oral Mucosa] : normal oral mucosa [No Oral Pallor] : no oral pallor [No Oral Cyanosis] : no oral cyanosis [Normal Jugular Venous A Waves Present] : normal jugular venous A waves present [Normal Jugular Venous V Waves Present] : normal jugular venous V waves present [No Jugular Venous Mccrary A Waves] : no jugular venous mccrary A waves [Respiration, Rhythm And Depth] : normal respiratory rhythm and effort [Exaggerated Use Of Accessory Muscles For Inspiration] : no accessory muscle use [Auscultation Breath Sounds / Voice Sounds] : lungs were clear to auscultation bilaterally [Abdomen Soft] : soft [Abdomen Tenderness] : non-tender [Abdomen Mass (___ Cm)] : no abdominal mass palpated [Abnormal Walk] : normal gait [Gait - Sufficient For Exercise Testing] : the gait was sufficient for exercise testing [Nail Clubbing] : no clubbing of the fingernails [Cyanosis, Localized] : no localized cyanosis [Petechial Hemorrhages (___cm)] : no petechial hemorrhages [Skin Color & Pigmentation] : normal skin color and pigmentation [] : no rash [Oriented To Time, Place, And Person] : oriented to person, place, and time [Affect] : the affect was normal [Mood] : the mood was normal [No Anxiety] : not feeling anxious [Normal Rate] : normal [Normal S1] : normal S1 [Normal S2] : normal S2 [No Murmur] : no murmurs heard [2+] : left 2+ [No Pitting Edema] : no pitting edema present [S3] : no S3 [S4] : no S4 [Right Carotid Bruit] : no bruit heard over the right carotid [Left Carotid Bruit] : no bruit heard over the left carotid [Right Femoral Bruit] : no bruit heard over the right femoral artery [Left Femoral Bruit] : no bruit heard over the left femoral artery

## 2021-07-29 NOTE — HISTORY OF PRESENT ILLNESS
[FreeTextEntry1] : Elinor Kern presented to the office today for a followup cardiovascular evaluation.  She was last seen in the office 1 month ago.\par \par She is now 87 years old, with a history of hypertension with a significant reactive component. She has a history of high cholesterol, for which she has been taking atorvastatin. She has a history of temporal headaches, which she has previously related to emotional stress. This has been evaluated comprehensively in the past. More recently she has had minimally symptomatic versus asymptomatic PAF, and is anticoagulated.\par \par At the time of the last visit, she reported positional lightheadedness.  She described some lightheadedness when she would stand.  I was concerned that perhaps she was overmedicated, noting a recurrent inability to properly discern whether she was experiencing essential hypertension or reactive hypertension on any given office visit.  We discontinued amlodipine.  A follow-up ambulatory 24-hour blood pressure monitor revealed reasonably controlled blood pressures overall.\par \par Her symptoms are perhaps mildly improved.  She has not 100% sure.  She has not had any symptoms suggestive of recurrent atrial fibrillation, acknowledging that she did not really have reproducible and convincing symptoms with her documented episode in the past.  She has had some minor ecchymotic issues, but no major bleeding.

## 2021-07-29 NOTE — DISCUSSION/SUMMARY
[FreeTextEntry1] : She has not had any clinically evident episodes of atrial fibrillation. She should continue anticoagulation.\par \par \par Her blood pressure is elevated today, as usual.  However, it is lower than last time, even though she is on less medication.  I think that infection probably was somewhat overmedicated, and this has been a recurring issue for us given her reactive hypertension.\par \par For now, she will stay on the same medication.  She will call me if there are any new issues, and otherwise see me back again in about 6 months.  \par

## 2021-07-29 NOTE — CARDIOLOGY SUMMARY
[___] : [unfilled] [No Ischemia] : no Ischemia [No Exercise Ind Arr] : no exercise induced arrhythmias [No Symptoms] : no Symptoms [___] : [unfilled] [LVEF ___%] : LVEF [unfilled]% [None] : normal LV function [Normal] : normal LA size [Mild] : mild mitral regurgitation

## 2021-11-08 ENCOUNTER — RX RENEWAL (OUTPATIENT)
Age: 86
End: 2021-11-08

## 2021-12-02 ENCOUNTER — NON-APPOINTMENT (OUTPATIENT)
Age: 86
End: 2021-12-02

## 2021-12-02 ENCOUNTER — APPOINTMENT (OUTPATIENT)
Dept: CARDIOLOGY | Facility: CLINIC | Age: 86
End: 2021-12-02
Payer: MEDICARE

## 2021-12-02 VITALS
BODY MASS INDEX: 23.46 KG/M2 | OXYGEN SATURATION: 98 % | SYSTOLIC BLOOD PRESSURE: 169 MMHG | HEIGHT: 66 IN | WEIGHT: 146 LBS | HEART RATE: 70 BPM | DIASTOLIC BLOOD PRESSURE: 78 MMHG

## 2021-12-02 PROCEDURE — 93000 ELECTROCARDIOGRAM COMPLETE: CPT

## 2021-12-02 PROCEDURE — 99214 OFFICE O/P EST MOD 30 MIN: CPT

## 2021-12-02 RX ORDER — CYCLOSPORINE 0.5 MG/ML
0.05 EMULSION OPHTHALMIC
Refills: 0 | Status: ACTIVE | COMMUNITY

## 2021-12-02 RX ORDER — LOTEPREDNOL ETABONATE 5 MG/ML
0.5 SUSPENSION/ DROPS OPHTHALMIC
Refills: 0 | Status: ACTIVE | COMMUNITY

## 2021-12-02 RX ORDER — AMITRIPTYLINE HYDROCHLORIDE 10 MG/1
10 TABLET, FILM COATED ORAL DAILY
Refills: 0 | Status: ACTIVE | COMMUNITY

## 2021-12-02 RX ORDER — NETARSUDIL 0.2 MG/ML
0.02 SOLUTION/ DROPS OPHTHALMIC; TOPICAL
Refills: 0 | Status: ACTIVE | COMMUNITY

## 2021-12-02 NOTE — DISCUSSION/SUMMARY
[FreeTextEntry1] : She has not had any clinically evident episodes of atrial fibrillation. She should continue anticoagulation.\par \par \par Her blood pressure is elevated today, as usual.  However, we have established that she has well-controlled essential hypertension, with superimposed reactive hypertension.  She does not need an adjustment in her medications.\par \par She is optimized from a cardiovascular perspective for her planned eye surgery.  Routine hemodynamic monitoring is recommended.  She has been told that she should hold Eliquis for 3 days prior to the procedure.  She should resume it after the procedure, when the bleeding risk has been mitigated.\par \par She can see me in 6 months.

## 2021-12-02 NOTE — CARDIOLOGY SUMMARY
[___] : [unfilled] [No Ischemia] : no Ischemia [No Exercise Ind Arr] : no exercise induced arrhythmias [No Symptoms] : no Symptoms [LVEF ___%] : LVEF [unfilled]% [None] : normal LV function [Normal] : normal LA size [Mild] : mild mitral regurgitation

## 2021-12-02 NOTE — HISTORY OF PRESENT ILLNESS
[FreeTextEntry1] : Elinor Kern presented to the office today for a followup cardiovascular evaluation.  She was last seen in the office 3 months ago.\par \par She is now 87 years old, with a history of hypertension with a significant reactive component. She has a history of high cholesterol, for which she has been taking atorvastatin. She has a history of temporal headaches, which she has previously related to emotional stress. This has been evaluated comprehensively in the past. More recently she has had minimally symptomatic versus asymptomatic PAF, and is anticoagulated.\par \par In June, 2021, she reported positional lightheadedness.  She described some lightheadedness when she would stand.  I was concerned that perhaps she was overmedicated, noting a recurrent inability to properly discern whether she was experiencing essential hypertension or reactive hypertension on any given office visit.  We discontinued amlodipine.  A follow-up ambulatory 24-hour blood pressure monitor revealed reasonably controlled blood pressures overall.\par \par She presents to the office today having been feeling well from a cardiovascular perspective.  She has not had any symptoms suggestive of recurrent atrial fibrillation, acknowledging that she did not really have reproducible and convincing symptoms with her documented episode in the past.  She has had some minor ecchymotic issues, but no major bleeding.  She is planned for a corneal transplant in about 2 weeks.

## 2021-12-13 ENCOUNTER — OUTPATIENT (OUTPATIENT)
Dept: OUTPATIENT SERVICES | Facility: HOSPITAL | Age: 86
LOS: 1 days | End: 2021-12-13
Payer: MEDICARE

## 2021-12-13 DIAGNOSIS — Z20.828 CONTACT WITH AND (SUSPECTED) EXPOSURE TO OTHER VIRAL COMMUNICABLE DISEASES: ICD-10-CM

## 2021-12-13 DIAGNOSIS — Z96.7 PRESENCE OF OTHER BONE AND TENDON IMPLANTS: Chronic | ICD-10-CM

## 2021-12-13 DIAGNOSIS — Z98.49 CATARACT EXTRACTION STATUS, UNSPECIFIED EYE: Chronic | ICD-10-CM

## 2021-12-13 LAB — SARS-COV-2 RNA SPEC QL NAA+PROBE: SIGNIFICANT CHANGE UP

## 2021-12-13 PROCEDURE — 87635 SARS-COV-2 COVID-19 AMP PRB: CPT

## 2021-12-14 ENCOUNTER — TRANSCRIPTION ENCOUNTER (OUTPATIENT)
Age: 86
End: 2021-12-14

## 2021-12-15 ENCOUNTER — OUTPATIENT (OUTPATIENT)
Dept: OUTPATIENT SERVICES | Facility: HOSPITAL | Age: 86
LOS: 1 days | End: 2021-12-15
Payer: MEDICARE

## 2021-12-15 ENCOUNTER — RESULT REVIEW (OUTPATIENT)
Age: 86
End: 2021-12-15

## 2021-12-15 VITALS
RESPIRATION RATE: 16 BRPM | DIASTOLIC BLOOD PRESSURE: 51 MMHG | OXYGEN SATURATION: 96 % | HEART RATE: 62 BPM | SYSTOLIC BLOOD PRESSURE: 134 MMHG

## 2021-12-15 VITALS
SYSTOLIC BLOOD PRESSURE: 154 MMHG | RESPIRATION RATE: 22 BRPM | HEIGHT: 64 IN | HEART RATE: 69 BPM | DIASTOLIC BLOOD PRESSURE: 61 MMHG | OXYGEN SATURATION: 97 % | WEIGHT: 140.21 LBS | TEMPERATURE: 98 F

## 2021-12-15 DIAGNOSIS — Z98.49 CATARACT EXTRACTION STATUS, UNSPECIFIED EYE: Chronic | ICD-10-CM

## 2021-12-15 DIAGNOSIS — H18.20 UNSPECIFIED CORNEAL EDEMA: ICD-10-CM

## 2021-12-15 DIAGNOSIS — Z96.7 PRESENCE OF OTHER BONE AND TENDON IMPLANTS: Chronic | ICD-10-CM

## 2021-12-15 PROCEDURE — 65756 CORNEAL TRNSPL ENDOTHELIAL: CPT | Mod: RT

## 2021-12-15 PROCEDURE — 65756 CORNEAL TRNSPL ENDOTHELIAL: CPT | Mod: AS,RT

## 2021-12-15 PROCEDURE — V2785: CPT

## 2021-12-15 PROCEDURE — 87070 CULTURE OTHR SPECIMN AEROBIC: CPT

## 2021-12-15 PROCEDURE — 88304 TISSUE EXAM BY PATHOLOGIST: CPT | Mod: 26

## 2021-12-15 PROCEDURE — 88312 SPECIAL STAINS GROUP 1: CPT

## 2021-12-15 PROCEDURE — 88312 SPECIAL STAINS GROUP 1: CPT | Mod: 26

## 2021-12-15 PROCEDURE — 88304 TISSUE EXAM BY PATHOLOGIST: CPT

## 2021-12-15 PROCEDURE — 65757 PREP CORNEAL ENDO ALLOGRAFT: CPT | Mod: RT

## 2021-12-15 PROCEDURE — C1889: CPT

## 2021-12-15 NOTE — ASU DISCHARGE PLAN (ADULT/PEDIATRIC) - CARE PROVIDER_API CALL
Yessica Chan; TON[facility])  Ophthalmology  [address] [place] [address]  Phone: [phone]  Fax: [phone]  Follow Up Time:

## 2021-12-15 NOTE — ASU DISCHARGE PLAN (ADULT/PEDIATRIC) - ASU DC SPECIAL INSTRUCTIONSFT
Please call [phone] if you have any questions or if you have pain or vomiting despite taking Tylenol (after 4:30 PM-9am).      YOU MUST LAY FLAT ON YOUR BACK X 24 HOURS UNLESS EATING OR GOING TO BATHROOM.  ONLY USE THE PILLOW GIVEN TO YOU BY [facility].

## 2021-12-15 NOTE — ASU PATIENT PROFILE, ADULT - VISION (WITH CORRECTIVE LENSES IF THE PATIENT USUALLY WEARS THEM):
Right and left eye cloudy/Partially impaired: cannot see medication labels or newsprint, but can see obstacles in path, and the surrounding layout; can count fingers at arm's length

## 2021-12-15 NOTE — ASU DISCHARGE PLAN (ADULT/PEDIATRIC) - NS MD DC FALL RISK RISK
For information on Fall & Injury Prevention, visit: [email or web address] OR  [email or web address] OR  https://www.cdc.gov/steadi/patient.html

## 2022-01-05 LAB
CULTURE RESULTS: SIGNIFICANT CHANGE UP
SPECIMEN SOURCE: SIGNIFICANT CHANGE UP

## 2022-01-31 ENCOUNTER — RX RENEWAL (OUTPATIENT)
Age: 87
End: 2022-01-31

## 2022-04-27 ENCOUNTER — RX RENEWAL (OUTPATIENT)
Age: 87
End: 2022-04-27

## 2022-10-27 ENCOUNTER — RX RENEWAL (OUTPATIENT)
Age: 87
End: 2022-10-27

## 2023-01-09 ENCOUNTER — RX RENEWAL (OUTPATIENT)
Age: 88
End: 2023-01-09

## 2023-01-24 ENCOUNTER — RX RENEWAL (OUTPATIENT)
Age: 88
End: 2023-01-24

## 2023-03-16 ENCOUNTER — NON-APPOINTMENT (OUTPATIENT)
Age: 88
End: 2023-03-16

## 2023-03-16 ENCOUNTER — RX RENEWAL (OUTPATIENT)
Age: 88
End: 2023-03-16

## 2023-03-16 RX ORDER — APIXABAN 5 MG/1
5 TABLET, FILM COATED ORAL
Qty: 180 | Refills: 0 | Status: ACTIVE | COMMUNITY
Start: 2020-05-11

## 2023-03-16 RX ORDER — METOPROLOL SUCCINATE 50 MG/1
50 TABLET, EXTENDED RELEASE ORAL
Qty: 90 | Refills: 0 | Status: ACTIVE | COMMUNITY
Start: 2020-04-21

## 2023-06-19 NOTE — ASU PATIENT PROFILE, ADULT - FALL HARM RISK - HARM RISK INTERVENTIONS
rec'd request via [employer]. ran t/c 1/30ds rts until 06/28/2023   Assistance with ambulation/Assistance OOB with selected safe patient handling equipment/Communicate Risk of Fall with Harm to all staff/Reinforce activity limits and safety measures with patient and family/Tailored Fall Risk Interventions/Visual Cue: Yellow wristband and red socks/Bed in lowest position, wheels locked, appropriate side rails in place/Call bell, personal items and telephone in reach/Instruct patient to call for assistance before getting out of bed or chair/Non-slip footwear when patient is out of bed/[place] to call system/Physically safe environment - no spills, clutter or unnecessary equipment/Purposeful Proactive Rounding/Room/bathroom lighting operational, light cord in reach

## 2023-08-10 NOTE — PATIENT PROFILE ADULT - HAS THE PATIENT RECEIVED THE INFLUENZA VACCINE THIS SEASON?
Report called to Javi Arredondo.  Pt to transfer to [facility] via wheelchair yes...

## 2024-03-06 NOTE — CONSULT NOTE ADULT - PROBLEM SELECTOR RECOMMENDATION 2
chronic intermittent episodes , unclear ? positional vestibular rule out orthostatic hypotension    check orthostatic ambulatory

## 2025-02-27 NOTE — ED ADULT NURSE NOTE - NS ED NURSE RECORD ANOTHER HT AND WT
Bed: O37  Expected date: 2/27/25  Expected time: 1:13 AM  Means of arrival: [facility] ([id number])  Comments:  59M fistula pain x months  Missed appt earlier this week   Bed bound   108/60, 50, 18, 96%   Yes